# Patient Record
Sex: FEMALE | Race: WHITE | Employment: UNEMPLOYED | URBAN - NONMETROPOLITAN AREA
[De-identification: names, ages, dates, MRNs, and addresses within clinical notes are randomized per-mention and may not be internally consistent; named-entity substitution may affect disease eponyms.]

---

## 2017-12-11 ENCOUNTER — OFFICE VISIT (OUTPATIENT)
Dept: NEUROSURGERY | Age: 27
End: 2017-12-11
Payer: COMMERCIAL

## 2017-12-11 VITALS
DIASTOLIC BLOOD PRESSURE: 68 MMHG | SYSTOLIC BLOOD PRESSURE: 138 MMHG | HEIGHT: 65 IN | OXYGEN SATURATION: 90 % | HEART RATE: 136 BPM | BODY MASS INDEX: 36.65 KG/M2 | WEIGHT: 220 LBS

## 2017-12-11 DIAGNOSIS — R56.9 CONVULSIONS, UNSPECIFIED CONVULSION TYPE (HCC): Primary | ICD-10-CM

## 2017-12-11 PROCEDURE — 99204 OFFICE O/P NEW MOD 45 MIN: CPT | Performed by: PSYCHIATRY & NEUROLOGY

## 2017-12-11 RX ORDER — IBUPROFEN 800 MG/1
800 TABLET ORAL EVERY 8 HOURS PRN
Status: ON HOLD | COMMUNITY
Start: 2017-11-15 | End: 2019-09-20 | Stop reason: HOSPADM

## 2017-12-11 RX ORDER — LEVETIRACETAM 500 MG/1
TABLET ORAL
Status: ON HOLD | COMMUNITY
Start: 2017-11-28 | End: 2018-01-10 | Stop reason: HOSPADM

## 2017-12-11 RX ORDER — HYDROXYZINE PAMOATE 50 MG/1
CAPSULE ORAL
COMMUNITY
Start: 2017-11-28 | End: 2018-01-17

## 2017-12-11 RX ORDER — GABAPENTIN 600 MG/1
600 TABLET ORAL 4 TIMES DAILY
COMMUNITY
Start: 2017-11-28

## 2017-12-11 RX ORDER — BUPRENORPHINE AND NALOXONE 8; 2 MG/1; MG/1
FILM, SOLUBLE BUCCAL; SUBLINGUAL DAILY
COMMUNITY
End: 2017-12-11

## 2017-12-11 RX ORDER — BUPRENORPHINE HYDROCHLORIDE AND NALOXONE HYDROCHLORIDE DIHYDRATE 8; 2 MG/1; MG/1
2.5 TABLET SUBLINGUAL DAILY
Refills: 0 | COMMUNITY
Start: 2017-12-06

## 2017-12-11 RX ORDER — AMITRIPTYLINE HYDROCHLORIDE 150 MG/1
TABLET, FILM COATED ORAL 2 TIMES DAILY
COMMUNITY
Start: 2017-11-01 | End: 2018-01-17 | Stop reason: SDUPTHER

## 2017-12-11 NOTE — PROGRESS NOTES
[] Sleep Apnea  [x] Denies all unless marked  Hematological:[] Adenopathy [] Bruises/Bleeds Easily  [x] Denies all unless marked  Endocrine: [] Cold Intolerance [] Heat Intolerance [] Polydipsia [] Polyphagia [] Polyuria  [x]Denies all unless marked  Allergic/Immunologic:[] Environmental Allergies [] Food Allergies [] Immunocompromised state  [x] Denies all unless marked    PHYSICAL EXAM  /68   Pulse 136   Ht 5' 5\" (1.651 m)   Wt 220 lb (99.8 kg)   LMP 11/27/2017   SpO2 90%   BMI 36.61 kg/m²     Constitutional  No acute distress    HEENT- Conjunctiva normal.  No scars, masses, or lesions over external nose or ears  Musculoskeletal  No significant wasting of muscles noted, no bony deformities  Extremities - No clubbing, cyanosis or edema  Skin  Warm, dry, and intact. No rash, erythema, or pallor  Psychiatric  Mood, affect, and behavior appear normal      NEUROLOGICAL EXAM    Mental status   [x]Awake, alert, oriented   [x]Affect attention and concentration appear appropriate  [x]Recent and remote memory appears unremarkable  [x]Speech normal without dysarthria or aphasia, comprehension and repetition intact.    COMMENTS:    Cranial Nerves [x]No VF deficit to confrontation,  no papilledema on fundoscopic exam.  [x]PERRLA, EOMI, no nystagmus, conjugate eye movements, no ptosis  [x]Face symmetric  [x]Facial sensation intact  [x]Tongue midline no atrophy or fasciculations present  [x]Palate midline, hearing to finger rub normal bilaterally  [x]Shoulder shrug and SCM testing normal bilaterally  COMMENTS:   Motor   [x]5/5 strength x 4 extremities  [x]Normal bulk and tone  [x]No tremor present  [x]No rigidity or bradykinesia noted  COMMENTS:   Sensory  [x]Sensation intact to light touch, pin prick, vibration, and proprioception BLE  [x]Sensation intact to light touch, pin prick, vibration, and proprioception BUE  COMMENTS:   Coordination [x]FTN normal bilaterally   [x]HTS normal bilaterally  [x]LEXI normal

## 2017-12-12 ENCOUNTER — TELEPHONE (OUTPATIENT)
Dept: NEUROSURGERY | Age: 27
End: 2017-12-12

## 2017-12-12 NOTE — TELEPHONE ENCOUNTER
Called patient with test appointment ,LMOM. Could not reach patient with appointment  No one answered the phone.

## 2017-12-28 ENCOUNTER — HOSPITAL ENCOUNTER (OUTPATIENT)
Dept: MRI IMAGING | Age: 27
Discharge: HOME | End: 2017-12-28
Payer: COMMERCIAL

## 2017-12-28 DIAGNOSIS — R56.9 CONVULSIONS, UNSPECIFIED CONVULSION TYPE (HCC): ICD-10-CM

## 2017-12-28 PROCEDURE — A9577 INJ MULTIHANCE: HCPCS | Performed by: PSYCHIATRY & NEUROLOGY

## 2017-12-28 PROCEDURE — 70553 MRI BRAIN STEM W/O & W/DYE: CPT

## 2017-12-28 PROCEDURE — 6360000004 HC RX CONTRAST MEDICATION: Performed by: PSYCHIATRY & NEUROLOGY

## 2017-12-28 RX ADMIN — GADOBENATE DIMEGLUMINE 15 ML: 529 INJECTION, SOLUTION INTRAVENOUS at 09:17

## 2018-01-09 ENCOUNTER — TELEPHONE (OUTPATIENT)
Dept: PRIMARY CARE CLINIC | Age: 28
End: 2018-01-09

## 2018-01-09 ENCOUNTER — APPOINTMENT (OUTPATIENT)
Dept: CT IMAGING | Age: 28
End: 2018-01-09
Payer: COMMERCIAL

## 2018-01-09 ENCOUNTER — HOSPITAL ENCOUNTER (OUTPATIENT)
Age: 28
Setting detail: OBSERVATION
Discharge: HOME | End: 2018-01-10
Attending: EMERGENCY MEDICINE | Admitting: PSYCHIATRY & NEUROLOGY
Payer: COMMERCIAL

## 2018-01-09 ENCOUNTER — OFFICE VISIT (OUTPATIENT)
Dept: NEUROSURGERY | Age: 28
End: 2018-01-09
Payer: COMMERCIAL

## 2018-01-09 VITALS
SYSTOLIC BLOOD PRESSURE: 124 MMHG | WEIGHT: 220.2 LBS | HEART RATE: 117 BPM | DIASTOLIC BLOOD PRESSURE: 78 MMHG | OXYGEN SATURATION: 98 % | BODY MASS INDEX: 34.56 KG/M2 | HEIGHT: 67 IN

## 2018-01-09 DIAGNOSIS — R93.0 ABNORMAL MRI OF HEAD: ICD-10-CM

## 2018-01-09 DIAGNOSIS — R56.9 CONVULSIONS, UNSPECIFIED CONVULSION TYPE (HCC): Primary | ICD-10-CM

## 2018-01-09 DIAGNOSIS — R56.9 SEIZURES (HCC): Primary | ICD-10-CM

## 2018-01-09 PROBLEM — S09.90XA HEAD INJURY: Status: ACTIVE | Noted: 2018-01-09

## 2018-01-09 PROBLEM — F19.10 SUBSTANCE ABUSE (HCC): Status: ACTIVE | Noted: 2018-01-09

## 2018-01-09 PROBLEM — S06.9XAA TBI (TRAUMATIC BRAIN INJURY): Status: ACTIVE | Noted: 2018-01-09

## 2018-01-09 LAB
ALBUMIN SERPL-MCNC: 4.3 G/DL (ref 3.5–5.2)
ALP BLD-CCNC: 118 U/L (ref 35–104)
ALT SERPL-CCNC: 8 U/L (ref 5–33)
AMPHETAMINE SCREEN, URINE: NEGATIVE
ANION GAP SERPL CALCULATED.3IONS-SCNC: 12 MMOL/L (ref 7–19)
AST SERPL-CCNC: 13 U/L (ref 5–32)
BACTERIA: NEGATIVE /HPF
BARBITURATE SCREEN URINE: NEGATIVE
BASOPHILS ABSOLUTE: 0 K/UL (ref 0–0.2)
BASOPHILS RELATIVE PERCENT: 0.5 % (ref 0–1)
BENZODIAZEPINE SCREEN, URINE: NEGATIVE
BILIRUB SERPL-MCNC: <0.2 MG/DL (ref 0.2–1.2)
BILIRUBIN URINE: NEGATIVE
BLOOD, URINE: NEGATIVE
BUN BLDV-MCNC: 5 MG/DL (ref 6–20)
CALCIUM SERPL-MCNC: 8.9 MG/DL (ref 8.6–10)
CANNABINOID SCREEN URINE: NEGATIVE
CHLORIDE BLD-SCNC: 103 MMOL/L (ref 98–111)
CLARITY: CLEAR
CO2: 26 MMOL/L (ref 22–29)
COCAINE METABOLITE SCREEN URINE: NEGATIVE
COLOR: YELLOW
CREAT SERPL-MCNC: 0.7 MG/DL (ref 0.5–0.9)
EOSINOPHILS ABSOLUTE: 0.1 K/UL (ref 0–0.6)
EOSINOPHILS RELATIVE PERCENT: 2.1 % (ref 0–5)
EPITHELIAL CELLS, UA: 2 /HPF (ref 0–5)
GFR NON-AFRICAN AMERICAN: >60
GLUCOSE BLD-MCNC: 101 MG/DL (ref 74–109)
GLUCOSE URINE: NEGATIVE MG/DL
HCG QUALITATIVE: NEGATIVE
HCT VFR BLD CALC: 39.3 % (ref 37–47)
HEMOGLOBIN: 12.6 G/DL (ref 12–16)
HYALINE CASTS: 0 /HPF (ref 0–8)
KETONES, URINE: NEGATIVE MG/DL
LEUKOCYTE ESTERASE, URINE: ABNORMAL
LYMPHOCYTES ABSOLUTE: 1.8 K/UL (ref 1.1–4.5)
LYMPHOCYTES RELATIVE PERCENT: 27.1 % (ref 20–40)
Lab: ABNORMAL
MCH RBC QN AUTO: 27 PG (ref 27–31)
MCHC RBC AUTO-ENTMCNC: 32.1 G/DL (ref 33–37)
MCV RBC AUTO: 84.3 FL (ref 81–99)
MONOCYTES ABSOLUTE: 0.3 K/UL (ref 0–0.9)
MONOCYTES RELATIVE PERCENT: 3.8 % (ref 0–10)
NEUTROPHILS ABSOLUTE: 4.3 K/UL (ref 1.5–7.5)
NEUTROPHILS RELATIVE PERCENT: 66.2 % (ref 50–65)
NITRITE, URINE: NEGATIVE
OPIATE SCREEN URINE: POSITIVE
PDW BLD-RTO: 15.4 % (ref 11.5–14.5)
PH UA: 7
PLATELET # BLD: 165 K/UL (ref 130–400)
PMV BLD AUTO: 11 FL (ref 9.4–12.3)
POTASSIUM SERPL-SCNC: 3.8 MMOL/L (ref 3.5–5)
PROTEIN UA: NEGATIVE MG/DL
RBC # BLD: 4.66 M/UL (ref 4.2–5.4)
RBC UA: 1 /HPF (ref 0–4)
SODIUM BLD-SCNC: 141 MMOL/L (ref 136–145)
SPECIFIC GRAVITY UA: 1.01
TOTAL PROTEIN: 7.3 G/DL (ref 6.6–8.7)
UROBILINOGEN, URINE: 1 E.U./DL
WBC # BLD: 6.5 K/UL (ref 4.8–10.8)
WBC UA: 3 /HPF (ref 0–5)

## 2018-01-09 PROCEDURE — G0378 HOSPITAL OBSERVATION PER HR: HCPCS

## 2018-01-09 PROCEDURE — 99285 EMERGENCY DEPT VISIT HI MDM: CPT | Performed by: EMERGENCY MEDICINE

## 2018-01-09 PROCEDURE — 2580000003 HC RX 258: Performed by: PSYCHIATRY & NEUROLOGY

## 2018-01-09 PROCEDURE — 81001 URINALYSIS AUTO W/SCOPE: CPT

## 2018-01-09 PROCEDURE — 6360000002 HC RX W HCPCS: Performed by: PSYCHIATRY & NEUROLOGY

## 2018-01-09 PROCEDURE — 85025 COMPLETE CBC W/AUTO DIFF WBC: CPT

## 2018-01-09 PROCEDURE — G8427 DOCREV CUR MEDS BY ELIG CLIN: HCPCS | Performed by: PSYCHIATRY & NEUROLOGY

## 2018-01-09 PROCEDURE — 72125 CT NECK SPINE W/O DYE: CPT

## 2018-01-09 PROCEDURE — 36415 COLL VENOUS BLD VENIPUNCTURE: CPT

## 2018-01-09 PROCEDURE — 6360000002 HC RX W HCPCS

## 2018-01-09 PROCEDURE — 6370000000 HC RX 637 (ALT 250 FOR IP): Performed by: PSYCHIATRY & NEUROLOGY

## 2018-01-09 PROCEDURE — 80307 DRUG TEST PRSMV CHEM ANLYZR: CPT

## 2018-01-09 PROCEDURE — G8417 CALC BMI ABV UP PARAM F/U: HCPCS | Performed by: PSYCHIATRY & NEUROLOGY

## 2018-01-09 PROCEDURE — 99214 OFFICE O/P EST MOD 30 MIN: CPT | Performed by: PSYCHIATRY & NEUROLOGY

## 2018-01-09 PROCEDURE — 99285 EMERGENCY DEPT VISIT HI MDM: CPT

## 2018-01-09 PROCEDURE — 84703 CHORIONIC GONADOTROPIN ASSAY: CPT

## 2018-01-09 PROCEDURE — G8484 FLU IMMUNIZE NO ADMIN: HCPCS | Performed by: PSYCHIATRY & NEUROLOGY

## 2018-01-09 PROCEDURE — 80053 COMPREHEN METABOLIC PANEL: CPT

## 2018-01-09 PROCEDURE — 4004F PT TOBACCO SCREEN RCVD TLK: CPT | Performed by: PSYCHIATRY & NEUROLOGY

## 2018-01-09 PROCEDURE — 99223 1ST HOSP IP/OBS HIGH 75: CPT | Performed by: PSYCHIATRY & NEUROLOGY

## 2018-01-09 PROCEDURE — 70450 CT HEAD/BRAIN W/O DYE: CPT

## 2018-01-09 RX ORDER — AMITRIPTYLINE HYDROCHLORIDE 75 MG/1
150 TABLET, FILM COATED ORAL NIGHTLY
Status: DISCONTINUED | OUTPATIENT
Start: 2018-01-09 | End: 2018-01-10 | Stop reason: HOSPADM

## 2018-01-09 RX ORDER — BUPRENORPHINE HYDROCHLORIDE AND NALOXONE HYDROCHLORIDE DIHYDRATE 2; .5 MG/1; MG/1
6 TABLET SUBLINGUAL DAILY
Status: DISCONTINUED | OUTPATIENT
Start: 2018-01-09 | End: 2018-01-10 | Stop reason: HOSPADM

## 2018-01-09 RX ORDER — LORAZEPAM 2 MG/ML
4 INJECTION INTRAMUSCULAR ONCE
Status: COMPLETED | OUTPATIENT
Start: 2018-01-09 | End: 2018-01-09

## 2018-01-09 RX ORDER — LORAZEPAM 2 MG/ML
INJECTION INTRAMUSCULAR
Status: COMPLETED
Start: 2018-01-09 | End: 2018-01-09

## 2018-01-09 RX ORDER — LEVETIRACETAM 500 MG/1
1500 TABLET ORAL 2 TIMES DAILY
Status: DISCONTINUED | OUTPATIENT
Start: 2018-01-09 | End: 2018-01-10 | Stop reason: HOSPADM

## 2018-01-09 RX ORDER — ONDANSETRON 4 MG/1
4 TABLET, ORALLY DISINTEGRATING ORAL EVERY 8 HOURS PRN
Status: DISCONTINUED | OUTPATIENT
Start: 2018-01-09 | End: 2018-01-10 | Stop reason: HOSPADM

## 2018-01-09 RX ORDER — LORAZEPAM 2 MG/ML
2 INJECTION INTRAMUSCULAR
Status: DISCONTINUED | OUTPATIENT
Start: 2018-01-09 | End: 2018-01-10 | Stop reason: HOSPADM

## 2018-01-09 RX ORDER — BUSPIRONE HYDROCHLORIDE 10 MG/1
10 TABLET ORAL DAILY
Status: ON HOLD | COMMUNITY
Start: 2017-12-20 | End: 2018-01-09

## 2018-01-09 RX ORDER — SODIUM CHLORIDE 9 MG/ML
INJECTION, SOLUTION INTRAVENOUS CONTINUOUS
Status: DISCONTINUED | OUTPATIENT
Start: 2018-01-09 | End: 2018-01-10 | Stop reason: HOSPADM

## 2018-01-09 RX ORDER — GABAPENTIN 600 MG/1
600 TABLET ORAL DAILY
Status: DISCONTINUED | OUTPATIENT
Start: 2018-01-09 | End: 2018-01-10 | Stop reason: HOSPADM

## 2018-01-09 RX ORDER — SODIUM CHLORIDE 0.9 % (FLUSH) 0.9 %
10 SYRINGE (ML) INJECTION PRN
Status: DISCONTINUED | OUTPATIENT
Start: 2018-01-09 | End: 2018-01-10 | Stop reason: HOSPADM

## 2018-01-09 RX ORDER — SODIUM CHLORIDE 0.9 % (FLUSH) 0.9 %
10 SYRINGE (ML) INJECTION EVERY 12 HOURS SCHEDULED
Status: DISCONTINUED | OUTPATIENT
Start: 2018-01-09 | End: 2018-01-10 | Stop reason: HOSPADM

## 2018-01-09 RX ORDER — ONDANSETRON 2 MG/ML
4 INJECTION INTRAMUSCULAR; INTRAVENOUS EVERY 6 HOURS PRN
Status: DISCONTINUED | OUTPATIENT
Start: 2018-01-09 | End: 2018-01-10 | Stop reason: HOSPADM

## 2018-01-09 RX ORDER — BUSPIRONE HYDROCHLORIDE 10 MG/1
10 TABLET ORAL DAILY
Status: DISCONTINUED | OUTPATIENT
Start: 2018-01-09 | End: 2018-01-10 | Stop reason: HOSPADM

## 2018-01-09 RX ORDER — ACETAMINOPHEN 325 MG/1
650 TABLET ORAL EVERY 4 HOURS PRN
Status: DISCONTINUED | OUTPATIENT
Start: 2018-01-09 | End: 2018-01-10 | Stop reason: HOSPADM

## 2018-01-09 RX ORDER — ACETAMINOPHEN 325 MG/1
650 TABLET ORAL EVERY 4 HOURS PRN
Status: DISCONTINUED | OUTPATIENT
Start: 2018-01-09 | End: 2018-01-09

## 2018-01-09 RX ADMIN — LORAZEPAM 4 MG: 2 INJECTION INTRAMUSCULAR; INTRAVENOUS at 16:19

## 2018-01-09 RX ADMIN — GABAPENTIN 600 MG: 600 TABLET, FILM COATED ORAL at 20:50

## 2018-01-09 RX ADMIN — LEVETIRACETAM 1500 MG: 500 TABLET ORAL at 20:51

## 2018-01-09 RX ADMIN — AMITRIPTYLINE HYDROCHLORIDE 150 MG: 75 TABLET, FILM COATED ORAL at 20:50

## 2018-01-09 RX ADMIN — LORAZEPAM 4 MG: 2 INJECTION INTRAMUSCULAR at 16:19

## 2018-01-09 RX ADMIN — BUPRENORPHINE AND NALOXONE 6 TABLET: 2; .5 TABLET SUBLINGUAL at 21:00

## 2018-01-09 RX ADMIN — Medication 10 ML: at 20:11

## 2018-01-09 RX ADMIN — ACETAMINOPHEN 650 MG: 325 TABLET ORAL at 20:11

## 2018-01-09 RX ADMIN — SODIUM CHLORIDE: 9 INJECTION, SOLUTION INTRAVENOUS at 20:11

## 2018-01-09 ASSESSMENT — PAIN SCALES - GENERAL
PAINLEVEL_OUTOF10: 3
PAINLEVEL_OUTOF10: 9
PAINLEVEL_OUTOF10: 5

## 2018-01-09 ASSESSMENT — ENCOUNTER SYMPTOMS
COUGH: 0
SHORTNESS OF BREATH: 0

## 2018-01-09 NOTE — ED TRIAGE NOTES
Family states they just left Dr. Chris Russell office for regular appointment and got in vehicle and started having a seizure. Family member went back to Doctors office and was told to bring her to ED. Patient is alert at this time.

## 2018-01-09 NOTE — ED PROVIDER NOTES
Cardiovascular: Regular rhythm and normal heart sounds. Mild tachy   Pulmonary/Chest: Effort normal and breath sounds normal.   Abdominal: Soft. Musculoskeletal: She exhibits no edema. Neurological: No cranial nerve deficit. Skin: Skin is warm and dry. She is not diaphoretic. Psychiatric:   Slow responses, keeps eyes closed, but responds. Sits up when asked. Nursing note and vitals reviewed. DIAGNOSTIC RESULTS     EKG: All EKG's are interpreted by the Emergency Department Physician who either signs or Co-signs this chart in the absence of a cardiologist.        RADIOLOGY:   Non-plain film images such as CT, Ultrasound and MRI are read by the radiologist. Plain radiographic images are visualized and preliminarily interpreted by the emergency physician with the below findings:        Interpretation per the Radiologist below, if available at the time of this note:    CT Cervical Spine WO Contrast   Final Result   No evidence of fracture or displacement/malalignment. The above findings are recorded on a digital voice clip in PACS. Signed by Dr Jose Miguel Bowen on 1/9/2018 4:50 PM      CT Head WO Contrast   Final Result   1. No acute intracranial process. 2. Left parietal subgaleal hematoma with overlying skin laceration. No   calvarial fracture. 3. Ostiomeatal unit pattern of chronic sinus disease, with   opacification of the left maxillary and left frontal sinuses in   addition to the anterior ethmoids.    Signed by Dr Ade Mac on 1/9/2018 4:46 PM            ED BEDSIDE ULTRASOUND:   Performed by ED Physician - none    LABS:  Labs Reviewed   CBC WITH AUTO DIFFERENTIAL - Abnormal; Notable for the following:        Result Value    MCHC 32.1 (*)     RDW 15.4 (*)     Neutrophils % 66.2 (*)     All other components within normal limits   COMPREHENSIVE METABOLIC PANEL - Abnormal; Notable for the following:     BUN 5 (*)     Alkaline Phosphatase 118 (*)     All other components within normal

## 2018-01-09 NOTE — H&P
Lumbar spine instability     Seizures (HCC)     Substance abuse     opioid addition        Past Surgical History:      Procedure Laterality Date    APPENDECTOMY      LEEP      MOUTH SURGERY      REMOVAL OF ALL TEETH BUT 3       Medications in Hospital:    No current facility-administered medications for this encounter. Current Outpatient Prescriptions:     busPIRone (BUSPAR) 10 MG tablet, Take 10 mg by mouth daily, Disp: , Rfl:     gabapentin (NEURONTIN) 600 MG tablet, , Disp: , Rfl:     levETIRAcetam (KEPPRA) 500 MG tablet, , Disp: , Rfl:     hydrOXYzine (VISTARIL) 50 MG capsule, , Disp: , Rfl:     buprenorphine-naloxone (SUBOXONE) 8-2 MG SUBL SL tablet, TAKE 1.5 (ONE AND A HALF) TABLET BY MOUTH SUBLINGUALLY EVERY DAY, Disp: , Rfl: 0    amitriptyline (ELAVIL) 150 MG tablet, , Disp: , Rfl:     ibuprofen (ADVIL;MOTRIN) 800 MG tablet, , Disp: , Rfl:     Allergies:  Review of patient's allergies indicates no known allergies. Social History:   TOBACCO:   reports that she has been smoking. She has been smoking about 0.50 packs per day. She has never used smokeless tobacco.  ETOH:   reports that she does not drink alcohol. Family History:       Problem Relation Age of Onset    High Blood Pressure Father            Physical Exam:    Vitals: BP (!) 138/90   Pulse 91   Temp 99 °F (37.2 °C) (Tympanic)   Resp 16   Ht 5' 7\" (1.702 m)   Wt 220 lb (99.8 kg)   LMP 12/08/2017   SpO2 98%   BMI 34.46 kg/m²     Constitutional  well developed, well nourished.     Eyes  conjunctiva normal.  Pupils react to light  Ear, nose, throat -hearing intact to finger rub No scars, masses, or lesions over external nose or ears, no atrophy of tongue  Neck-symmetric, no masses noted, no jugular vein distension  Respiration- chest wall appears symmetric, good expansion,   normal effort without use of accessory muscles  Musculoskeletal  no significant wasting of muscles noted, no bony deformities  Extremities-no

## 2018-01-09 NOTE — ED NOTES
Pt family came into lobby stating patient was having a seizure in the truck. Upon viewing patient, she was noted lighting a cigarette. Patient was alert and verbal;y appropriate and followed commands appropriately. Patient transferred to wheel chair per self and was taken to the registration desk. Patient was in no distress.      Brice Moore RN  [phone]

## 2018-01-10 ENCOUNTER — TELEPHONE (OUTPATIENT)
Dept: NEUROSURGERY | Age: 28
End: 2018-01-10

## 2018-01-10 ENCOUNTER — TELEPHONE (OUTPATIENT)
Dept: PRIMARY CARE CLINIC | Age: 28
End: 2018-01-10

## 2018-01-10 VITALS
HEART RATE: 92 BPM | BODY MASS INDEX: 34.53 KG/M2 | OXYGEN SATURATION: 94 % | DIASTOLIC BLOOD PRESSURE: 76 MMHG | SYSTOLIC BLOOD PRESSURE: 105 MMHG | HEIGHT: 67 IN | TEMPERATURE: 97.6 F | RESPIRATION RATE: 18 BRPM | WEIGHT: 220 LBS

## 2018-01-10 PROCEDURE — 6370000000 HC RX 637 (ALT 250 FOR IP): Performed by: PSYCHIATRY & NEUROLOGY

## 2018-01-10 PROCEDURE — 6360000002 HC RX W HCPCS: Performed by: PSYCHIATRY & NEUROLOGY

## 2018-01-10 PROCEDURE — G0378 HOSPITAL OBSERVATION PER HR: HCPCS

## 2018-01-10 PROCEDURE — 99239 HOSP IP/OBS DSCHRG MGMT >30: CPT | Performed by: PSYCHIATRY & NEUROLOGY

## 2018-01-10 PROCEDURE — 96374 THER/PROPH/DIAG INJ IV PUSH: CPT

## 2018-01-10 RX ORDER — PHENYTOIN SODIUM 100 MG/1
100 CAPSULE, EXTENDED RELEASE ORAL 3 TIMES DAILY
Qty: 90 CAPSULE | Refills: 1 | Status: SHIPPED | OUTPATIENT
Start: 2018-01-10 | End: 2018-03-06 | Stop reason: SDUPTHER

## 2018-01-10 RX ORDER — LEVETIRACETAM 750 MG/1
1500 TABLET ORAL 2 TIMES DAILY
Qty: 120 TABLET | Refills: 1 | Status: SHIPPED | OUTPATIENT
Start: 2018-01-10 | End: 2018-03-06 | Stop reason: SDUPTHER

## 2018-01-10 RX ORDER — PHENYTOIN SODIUM 100 MG/1
100 CAPSULE, EXTENDED RELEASE ORAL 3 TIMES DAILY
Status: DISCONTINUED | OUTPATIENT
Start: 2018-01-10 | End: 2018-01-10 | Stop reason: HOSPADM

## 2018-01-10 RX ADMIN — BUPRENORPHINE AND NALOXONE 6 TABLET: 2; .5 TABLET SUBLINGUAL at 08:34

## 2018-01-10 RX ADMIN — PHENYTOIN SODIUM 100 MG: 100 CAPSULE ORAL at 13:56

## 2018-01-10 RX ADMIN — LEVETIRACETAM 1500 MG: 500 TABLET ORAL at 08:41

## 2018-01-10 RX ADMIN — BUSPIRONE HYDROCHLORIDE 10 MG: 10 TABLET ORAL at 08:34

## 2018-01-10 RX ADMIN — GABAPENTIN 600 MG: 600 TABLET, FILM COATED ORAL at 08:34

## 2018-01-10 RX ADMIN — PHENYTOIN SODIUM 100 MG: 100 CAPSULE ORAL at 08:41

## 2018-01-10 RX ADMIN — ACETAMINOPHEN 650 MG: 325 TABLET ORAL at 14:09

## 2018-01-10 ASSESSMENT — PAIN SCALES - GENERAL
PAINLEVEL_OUTOF10: 7
PAINLEVEL_OUTOF10: 8

## 2018-01-10 NOTE — DISCHARGE INSTR - DIET

## 2018-01-10 NOTE — TELEPHONE ENCOUNTER
hospital call needed to 2 week f/u and a test schedule for candace ed couch,please call the patient with appt

## 2018-01-11 ENCOUNTER — TELEPHONE (OUTPATIENT)
Dept: NEUROSURGERY | Age: 28
End: 2018-01-11

## 2018-01-16 ENCOUNTER — TELEPHONE (OUTPATIENT)
Dept: NEUROSURGERY | Age: 28
End: 2018-01-16

## 2018-01-16 NOTE — TELEPHONE ENCOUNTER
Called and left message about appt being canceled due to bad weather  And to please call back later to r/s  jf

## 2018-01-17 ENCOUNTER — TELEPHONE (OUTPATIENT)
Dept: NEUROSURGERY | Age: 28
End: 2018-01-17

## 2018-01-17 ENCOUNTER — OFFICE VISIT (OUTPATIENT)
Dept: PRIMARY CARE CLINIC | Age: 28
End: 2018-01-17
Payer: COMMERCIAL

## 2018-01-17 VITALS
WEIGHT: 222.2 LBS | HEIGHT: 67 IN | BODY MASS INDEX: 34.88 KG/M2 | SYSTOLIC BLOOD PRESSURE: 132 MMHG | HEART RATE: 122 BPM | TEMPERATURE: 98.2 F | DIASTOLIC BLOOD PRESSURE: 88 MMHG | OXYGEN SATURATION: 98 %

## 2018-01-17 DIAGNOSIS — Z09 HOSPITAL DISCHARGE FOLLOW-UP: ICD-10-CM

## 2018-01-17 DIAGNOSIS — Z71.6 TOBACCO ABUSE COUNSELING: ICD-10-CM

## 2018-01-17 DIAGNOSIS — Z76.89 ENCOUNTER TO ESTABLISH CARE: Primary | ICD-10-CM

## 2018-01-17 DIAGNOSIS — R56.9 SEIZURES (HCC): ICD-10-CM

## 2018-01-17 DIAGNOSIS — F19.10 SUBSTANCE ABUSE (HCC): ICD-10-CM

## 2018-01-17 DIAGNOSIS — Z72.0 TOBACCO ABUSE: ICD-10-CM

## 2018-01-17 PROCEDURE — G8484 FLU IMMUNIZE NO ADMIN: HCPCS | Performed by: NURSE PRACTITIONER

## 2018-01-17 PROCEDURE — 4004F PT TOBACCO SCREEN RCVD TLK: CPT | Performed by: NURSE PRACTITIONER

## 2018-01-17 PROCEDURE — G8428 CUR MEDS NOT DOCUMENT: HCPCS | Performed by: NURSE PRACTITIONER

## 2018-01-17 PROCEDURE — 99408 AUDIT/DAST 15-30 MIN: CPT | Performed by: NURSE PRACTITIONER

## 2018-01-17 PROCEDURE — 99203 OFFICE O/P NEW LOW 30 MIN: CPT | Performed by: NURSE PRACTITIONER

## 2018-01-17 PROCEDURE — G8417 CALC BMI ABV UP PARAM F/U: HCPCS | Performed by: NURSE PRACTITIONER

## 2018-01-17 PROCEDURE — 99406 BEHAV CHNG SMOKING 3-10 MIN: CPT | Performed by: NURSE PRACTITIONER

## 2018-01-17 PROCEDURE — 1111F DSCHRG MED/CURRENT MED MERGE: CPT | Performed by: NURSE PRACTITIONER

## 2018-01-17 RX ORDER — AMITRIPTYLINE HYDROCHLORIDE 150 MG/1
150 TABLET, FILM COATED ORAL 2 TIMES DAILY
Qty: 60 TABLET | Refills: 1 | Status: SHIPPED | OUTPATIENT
Start: 2018-01-17 | End: 2018-07-24

## 2018-01-17 ASSESSMENT — ENCOUNTER SYMPTOMS
EYES NEGATIVE: 1
GASTROINTESTINAL NEGATIVE: 1
RESPIRATORY NEGATIVE: 1

## 2018-01-17 NOTE — PROGRESS NOTES
times daily 60 tablet 1    levETIRAcetam (KEPPRA) 750 MG tablet Take 2 tablets by mouth 2 times daily 120 tablet 1    phenytoin (DILANTIN) 100 MG ER capsule Take 1 capsule by mouth 3 times daily 90 capsule 1    gabapentin (NEURONTIN) 600 MG tablet 600 mg .  buprenorphine-naloxone (SUBOXONE) 8-2 MG SUBL SL tablet TAKE 1.5 (ONE AND A HALF) TABLET BY MOUTH SUBLINGUALLY EVERY DAY  0    ibuprofen (ADVIL;MOTRIN) 800 MG tablet 800 mg every 6 hours as needed        No current facility-administered medications for this visit. No Known Allergies    Family History   Problem Relation Age of Onset    High Blood Pressure Father          Subjective:      Review of Systems   Constitutional: Negative. HENT: Negative. Eyes: Negative. Respiratory: Negative. Cardiovascular: Negative. Gastrointestinal: Negative. Endocrine: Negative. Genitourinary: Negative. Musculoskeletal: Negative. Skin: Negative. Neurological: Positive for seizures. Negative for dizziness and weakness. Hematological: Negative. Psychiatric/Behavioral: Negative. Objective:     Physical Exam   Constitutional: She is oriented to person, place, and time. Vital signs are normal. She appears well-developed and well-nourished. HENT:   Head: Normocephalic and atraumatic. Right Ear: Hearing, tympanic membrane, external ear and ear canal normal.   Left Ear: Hearing, tympanic membrane, external ear and ear canal normal.   Nose: Nose normal.   Mouth/Throat: Uvula is midline, oropharynx is clear and moist and mucous membranes are normal.   Eyes: Conjunctivae, EOM and lids are normal. Pupils are equal, round, and reactive to light. Neck: Trachea normal and normal range of motion. Neck supple. No thyroid mass and no thyromegaly present. Cardiovascular: Normal rate, regular rhythm, normal heart sounds and normal pulses. Pulmonary/Chest: Effort normal and breath sounds normal.   Abdominal: Soft.  Normal appearance

## 2018-01-22 ENCOUNTER — HOSPITAL ENCOUNTER (OUTPATIENT)
Dept: MRI IMAGING | Age: 28
Discharge: HOME | End: 2018-01-22
Payer: COMMERCIAL

## 2018-01-22 DIAGNOSIS — R93.0 ABNORMAL MRI OF HEAD: ICD-10-CM

## 2018-01-22 DIAGNOSIS — R56.9 CONVULSIONS, UNSPECIFIED CONVULSION TYPE (HCC): ICD-10-CM

## 2018-01-22 PROCEDURE — 6360000004 HC RX CONTRAST MEDICATION: Performed by: PSYCHIATRY & NEUROLOGY

## 2018-01-22 PROCEDURE — A9577 INJ MULTIHANCE: HCPCS | Performed by: PSYCHIATRY & NEUROLOGY

## 2018-01-22 PROCEDURE — 70553 MRI BRAIN STEM W/O & W/DYE: CPT

## 2018-01-22 RX ADMIN — GADOBENATE DIMEGLUMINE 20 ML: 529 INJECTION, SOLUTION INTRAVENOUS at 09:42

## 2018-01-24 ENCOUNTER — OFFICE VISIT (OUTPATIENT)
Dept: NEUROSURGERY | Age: 28
End: 2018-01-24
Payer: COMMERCIAL

## 2018-01-24 DIAGNOSIS — R56.9 CONVULSIONS, UNSPECIFIED CONVULSION TYPE (HCC): Primary | ICD-10-CM

## 2018-01-24 DIAGNOSIS — R56.9 CONVULSIONS, UNSPECIFIED CONVULSION TYPE (HCC): ICD-10-CM

## 2018-01-24 DIAGNOSIS — R93.0 ABNORMAL MRI OF HEAD: ICD-10-CM

## 2018-01-24 LAB
ALBUMIN SERPL-MCNC: 4.2 G/DL (ref 3.5–5.2)
ALP BLD-CCNC: 157 U/L (ref 35–104)
ALT SERPL-CCNC: 8 U/L (ref 5–33)
ANION GAP SERPL CALCULATED.3IONS-SCNC: 15 MMOL/L (ref 7–19)
AST SERPL-CCNC: 13 U/L (ref 5–32)
BASOPHILS ABSOLUTE: 0.1 K/UL (ref 0–0.2)
BASOPHILS RELATIVE PERCENT: 0.9 % (ref 0–1)
BILIRUB SERPL-MCNC: <0.2 MG/DL (ref 0.2–1.2)
BUN BLDV-MCNC: 9 MG/DL (ref 6–20)
CALCIUM SERPL-MCNC: 9.3 MG/DL (ref 8.6–10)
CHLORIDE BLD-SCNC: 101 MMOL/L (ref 98–111)
CO2: 27 MMOL/L (ref 22–29)
CREAT SERPL-MCNC: 0.6 MG/DL (ref 0.5–0.9)
EOSINOPHILS ABSOLUTE: 0.6 K/UL (ref 0–0.6)
EOSINOPHILS RELATIVE PERCENT: 9.5 % (ref 0–5)
GFR NON-AFRICAN AMERICAN: >60
GLUCOSE BLD-MCNC: 88 MG/DL (ref 74–109)
HCT VFR BLD CALC: 42 % (ref 37–47)
HEMOGLOBIN: 13.2 G/DL (ref 12–16)
LYMPHOCYTES ABSOLUTE: 2.7 K/UL (ref 1.1–4.5)
LYMPHOCYTES RELATIVE PERCENT: 46.8 % (ref 20–40)
MCH RBC QN AUTO: 26.6 PG (ref 27–31)
MCHC RBC AUTO-ENTMCNC: 31.4 G/DL (ref 33–37)
MCV RBC AUTO: 84.7 FL (ref 81–99)
MONOCYTES ABSOLUTE: 0.3 K/UL (ref 0–0.9)
MONOCYTES RELATIVE PERCENT: 5.7 % (ref 0–10)
NEUTROPHILS ABSOLUTE: 2.2 K/UL (ref 1.5–7.5)
NEUTROPHILS RELATIVE PERCENT: 36.9 % (ref 50–65)
PDW BLD-RTO: 15.3 % (ref 11.5–14.5)
PHENYTOIN LEVEL: 2 UG/ML (ref 10–20)
PLATELET # BLD: 227 K/UL (ref 130–400)
PMV BLD AUTO: 10.9 FL (ref 9.4–12.3)
POTASSIUM SERPL-SCNC: 4.1 MMOL/L (ref 3.5–5)
RBC # BLD: 4.96 M/UL (ref 4.2–5.4)
SODIUM BLD-SCNC: 143 MMOL/L (ref 136–145)
TOTAL PROTEIN: 7.5 G/DL (ref 6.6–8.7)
WBC # BLD: 5.8 K/UL (ref 4.8–10.8)

## 2018-01-24 PROCEDURE — 4004F PT TOBACCO SCREEN RCVD TLK: CPT | Performed by: PSYCHIATRY & NEUROLOGY

## 2018-01-24 PROCEDURE — 99214 OFFICE O/P EST MOD 30 MIN: CPT | Performed by: PSYCHIATRY & NEUROLOGY

## 2018-01-24 PROCEDURE — G8484 FLU IMMUNIZE NO ADMIN: HCPCS | Performed by: PSYCHIATRY & NEUROLOGY

## 2018-01-24 PROCEDURE — G8417 CALC BMI ABV UP PARAM F/U: HCPCS | Performed by: PSYCHIATRY & NEUROLOGY

## 2018-01-24 PROCEDURE — G8427 DOCREV CUR MEDS BY ELIG CLIN: HCPCS | Performed by: PSYCHIATRY & NEUROLOGY

## 2018-01-24 NOTE — PROGRESS NOTES
Ataxia  [x] Denies all unless marked  Psychiatric/Behavioral:[] Depression [] Anxiety [] Confusion [] Agitation [] Behavior Problems  [] Hallucinations  [] Suicidal idiation  [x] Denies all unless marked  Sleep: []  Insomnia [] Sleep Disturbance [] Snoring [] Restless Legs [] Daytime Sleeping [] Sleep Apnea  [x] Denies all unless marked  Hematological:[] Adenopathy [] Bruises/Bleeds Easily  [x] Denies all unless marked  Endocrine: [] Cold Intolerance [] Heat Intolerance [] Polydipsia [] Polyphagia [] Polyuria  [x]Denies all unless marked  Allergic/Immunologic:[] Environmental Allergies [] Food Allergies [] Immunocompromised state  [x] Denies all unless marked    PHYSICAL EXAM  LMP 12/08/2017     Constitutional  No acute distress    HEENT- Conjunctiva normal.  No scars, masses, or lesions over external nose or ears  Musculoskeletal  No significant wasting of muscles noted, no bony deformities  Extremities - No clubbing, cyanosis or edema  Skin  Warm, dry, and intact. No rash, erythema, or pallor  Psychiatric  Mood, affect, and behavior appear normal      NEUROLOGICAL EXAM    Mental status   [x]Awake, alert, oriented   [x]Affect attention and concentration appear appropriate  [x]Recent and remote memory appears unremarkable  [x]Speech normal without dysarthria or aphasia, comprehension and repetition intact.    COMMENTS:    Cranial Nerves [x]No VF deficit to confrontation,  no papilledema on fundoscopic exam.  [x]PERRLA, EOMI, no nystagmus, conjugate eye movements, no ptosis  [x]Face symmetric  [x]Facial sensation intact  [x]Tongue midline no atrophy or fasciculations present  [x]Palate midline, hearing to finger rub normal bilaterally  [x]Shoulder shrug and SCM testing normal bilaterally  COMMENTS:   Motor   [x]5/5 strength x 4 extremities  [x]Normal bulk and tone  [x]No tremor present  [x]No rigidity or bradykinesia noted  COMMENTS:   Sensory  [x]Sensation intact to light touch, pin prick, vibration, and

## 2018-01-25 ENCOUNTER — TELEPHONE (OUTPATIENT)
Dept: NEUROLOGY | Age: 28
End: 2018-01-25

## 2018-02-15 ENCOUNTER — TELEPHONE (OUTPATIENT)
Dept: NEUROSURGERY | Age: 28
End: 2018-02-15

## 2018-03-19 ENCOUNTER — HOSPITAL ENCOUNTER (OUTPATIENT)
Dept: NEUROLOGY | Age: 28
Setting detail: OBSERVATION
Discharge: HOME | End: 2018-03-21
Attending: PSYCHIATRY & NEUROLOGY | Admitting: PSYCHIATRY & NEUROLOGY
Payer: COMMERCIAL

## 2018-03-19 PROBLEM — R56.9 CONVULSIONS (HCC): Status: ACTIVE | Noted: 2018-03-19

## 2018-03-19 PROCEDURE — 95951 HC EEG MONITORING VIDEO RECORDING: CPT

## 2018-03-19 PROCEDURE — 99219 PR INITIAL OBSERVATION CARE/DAY 50 MINUTES: CPT | Performed by: PSYCHIATRY & NEUROLOGY

## 2018-03-19 PROCEDURE — G0378 HOSPITAL OBSERVATION PER HR: HCPCS

## 2018-03-19 PROCEDURE — 6360000002 HC RX W HCPCS: Performed by: PSYCHIATRY & NEUROLOGY

## 2018-03-19 PROCEDURE — 95951 PR EEG MONITORING/VIDEORECORD: CPT | Performed by: PSYCHIATRY & NEUROLOGY

## 2018-03-19 PROCEDURE — 6370000000 HC RX 637 (ALT 250 FOR IP): Performed by: PSYCHIATRY & NEUROLOGY

## 2018-03-19 RX ORDER — AMITRIPTYLINE HYDROCHLORIDE 75 MG/1
150 TABLET, FILM COATED ORAL NIGHTLY
Status: DISCONTINUED | OUTPATIENT
Start: 2018-03-19 | End: 2018-03-21 | Stop reason: HOSPADM

## 2018-03-19 RX ORDER — SODIUM CHLORIDE 0.9 % (FLUSH) 0.9 %
10 SYRINGE (ML) INJECTION EVERY 12 HOURS SCHEDULED
Status: DISCONTINUED | OUTPATIENT
Start: 2018-03-19 | End: 2018-03-21 | Stop reason: HOSPADM

## 2018-03-19 RX ORDER — PHENYTOIN SODIUM 100 MG/1
100 CAPSULE, EXTENDED RELEASE ORAL 3 TIMES DAILY
Status: DISCONTINUED | OUTPATIENT
Start: 2018-03-19 | End: 2018-03-20

## 2018-03-19 RX ORDER — BUPRENORPHINE HYDROCHLORIDE AND NALOXONE HYDROCHLORIDE DIHYDRATE 2; .5 MG/1; MG/1
6 TABLET SUBLINGUAL DAILY
Status: DISCONTINUED | OUTPATIENT
Start: 2018-03-19 | End: 2018-03-21 | Stop reason: HOSPADM

## 2018-03-19 RX ORDER — IBUPROFEN 400 MG/1
800 TABLET ORAL EVERY 8 HOURS PRN
Status: DISCONTINUED | OUTPATIENT
Start: 2018-03-19 | End: 2018-03-21 | Stop reason: HOSPADM

## 2018-03-19 RX ORDER — BUPRENORPHINE HYDROCHLORIDE AND NALOXONE HYDROCHLORIDE DIHYDRATE 8; 2 MG/1; MG/1
1.5 TABLET SUBLINGUAL DAILY
Status: DISCONTINUED | OUTPATIENT
Start: 2018-03-19 | End: 2018-03-19

## 2018-03-19 RX ORDER — NICOTINE 21 MG/24HR
1 PATCH, TRANSDERMAL 24 HOURS TRANSDERMAL DAILY
Status: DISCONTINUED | OUTPATIENT
Start: 2018-03-19 | End: 2018-03-21 | Stop reason: HOSPADM

## 2018-03-19 RX ORDER — SODIUM CHLORIDE 0.9 % (FLUSH) 0.9 %
10 SYRINGE (ML) INJECTION PRN
Status: DISCONTINUED | OUTPATIENT
Start: 2018-03-19 | End: 2018-03-21 | Stop reason: HOSPADM

## 2018-03-19 RX ORDER — GABAPENTIN 600 MG/1
600 TABLET ORAL 4 TIMES DAILY
Status: DISCONTINUED | OUTPATIENT
Start: 2018-03-19 | End: 2018-03-21 | Stop reason: HOSPADM

## 2018-03-19 RX ORDER — ONDANSETRON 2 MG/ML
4 INJECTION INTRAMUSCULAR; INTRAVENOUS EVERY 6 HOURS PRN
Status: DISCONTINUED | OUTPATIENT
Start: 2018-03-19 | End: 2018-03-21 | Stop reason: HOSPADM

## 2018-03-19 RX ORDER — LORAZEPAM 2 MG/ML
1 INJECTION INTRAMUSCULAR PRN
Status: DISCONTINUED | OUTPATIENT
Start: 2018-03-19 | End: 2018-03-21 | Stop reason: HOSPADM

## 2018-03-19 RX ORDER — BUPRENORPHINE HYDROCHLORIDE AND NALOXONE HYDROCHLORIDE DIHYDRATE 2; .5 MG/1; MG/1
6 TABLET SUBLINGUAL DAILY
Status: DISCONTINUED | OUTPATIENT
Start: 2018-03-19 | End: 2018-03-19

## 2018-03-19 RX ADMIN — PHENYTOIN SODIUM 100 MG: 100 CAPSULE ORAL at 20:24

## 2018-03-19 RX ADMIN — GABAPENTIN 600 MG: 600 TABLET, FILM COATED ORAL at 11:54

## 2018-03-19 RX ADMIN — BUPRENORPHINE AND NALOXONE 6 TABLET: 2; .5 TABLET SUBLINGUAL at 14:50

## 2018-03-19 RX ADMIN — GABAPENTIN 600 MG: 600 TABLET, FILM COATED ORAL at 17:01

## 2018-03-19 RX ADMIN — GABAPENTIN 600 MG: 600 TABLET, FILM COATED ORAL at 20:24

## 2018-03-19 RX ADMIN — PHENYTOIN SODIUM 100 MG: 100 CAPSULE ORAL at 10:53

## 2018-03-19 RX ADMIN — AMITRIPTYLINE HYDROCHLORIDE 150 MG: 75 TABLET, FILM COATED ORAL at 20:24

## 2018-03-19 ASSESSMENT — PAIN SCALES - GENERAL
PAINLEVEL_OUTOF10: 0
PAINLEVEL_OUTOF10: 7
PAINLEVEL_OUTOF10: 0
PAINLEVEL_OUTOF10: 0

## 2018-03-19 NOTE — PROGRESS NOTES
Patient is here for video EEG monitoring. She was set up using the 10/20 system. Patient's mother is staying with her while she is here.

## 2018-03-19 NOTE — H&P
HISTORY AND PHYSICAL      Patient:   Marlin Palomares  MR#:    [id number]  Account Number:                   [id number]      Room:    0334/334-01   YOB: 1990  Date of Progress Note: 3/19/2018  Time of Note                           10:46 AM  Attending Physician:  Racquel Graves D.O.       CHIEF COMPLAINT:  Convulsions     HISTORY OF PRESENT ILLNESS:   This 32 y.o. female who presents with recurrent seizure like episodes. Started 4 years ago, prior history of TBI, possible ICH noted, no surgery required. Seizures started a year or so after. Events described as MARAH, GTC activity noted, no bladder incontinence, no tongue biting. Occurring sporadically. MRI with nonspecific white matter abnormalities, no enhancement noted, one is pericallosal. Seen in the ED previously, admitted for breakthrough seizures. Keppra increased to 1500 mg bid and dilantin added 100 mg tid. No history of meningitis or encephalitis. Brother with epilepsy.    Admitted for EEG monitoring and event clarification.        REVIEW OF SYSTEMS  Constitutional:  []Activity change []Appetite change []Chills [] Diaphoresis []Fatigue []Fever []Unexpected Weight Change  [x] Denies all unless marked  HEENT:  []Congestion []Dental Problems []Drooling []Ear Discharge []Ear Pain  []Facial Swelling []Mouth Sores []Nosebleeds []Post Nasal Drip []Rhinorrhea []Sneezing []Sore Throat []Tinnitus []Trouble Swallowing []Voice Change [x] Denies all unless marked []Photophobia []Pain []Discharge []Redness []Itching []Visual Disturbance  [x] Denies all unless marked  Respiratory:  []Apnea []Cough []Choking []Chest Tightness []Shortness of Breath []Wheezing []Stridor  [x] Denies all unless marked  Cardiovascular:  []Chest Pain []Palpitations []Leg Swelling  [x] Denies all unless marked  Gastrointestinal:  []Abdominal Distention []Abdominal Pain []Anal Bleeding []Blood in Stool  []Diarrhea []Constipation []Nausea []Rectal Pain []Vomiting  [x] Denies

## 2018-03-20 PROCEDURE — 6360000002 HC RX W HCPCS: Performed by: PSYCHIATRY & NEUROLOGY

## 2018-03-20 PROCEDURE — 99225 PR SBSQ OBSERVATION CARE/DAY 25 MINUTES: CPT | Performed by: PSYCHIATRY & NEUROLOGY

## 2018-03-20 PROCEDURE — 95951 PR EEG MONITORING/VIDEORECORD: CPT | Performed by: PSYCHIATRY & NEUROLOGY

## 2018-03-20 PROCEDURE — 6370000000 HC RX 637 (ALT 250 FOR IP): Performed by: PSYCHIATRY & NEUROLOGY

## 2018-03-20 PROCEDURE — G0378 HOSPITAL OBSERVATION PER HR: HCPCS

## 2018-03-20 PROCEDURE — 95951 HC EEG MONITORING VIDEO RECORDING: CPT

## 2018-03-20 RX ADMIN — GABAPENTIN 600 MG: 600 TABLET, FILM COATED ORAL at 17:06

## 2018-03-20 RX ADMIN — BUPRENORPHINE AND NALOXONE 6 TABLET: 2; .5 TABLET SUBLINGUAL at 08:37

## 2018-03-20 RX ADMIN — GABAPENTIN 600 MG: 600 TABLET, FILM COATED ORAL at 08:37

## 2018-03-20 RX ADMIN — PHENYTOIN SODIUM 100 MG: 100 CAPSULE ORAL at 02:29

## 2018-03-20 RX ADMIN — GABAPENTIN 600 MG: 600 TABLET, FILM COATED ORAL at 13:25

## 2018-03-20 RX ADMIN — AMITRIPTYLINE HYDROCHLORIDE 150 MG: 75 TABLET, FILM COATED ORAL at 22:17

## 2018-03-20 RX ADMIN — GABAPENTIN 600 MG: 600 TABLET, FILM COATED ORAL at 22:17

## 2018-03-20 ASSESSMENT — PAIN SCALES - GENERAL
PAINLEVEL_OUTOF10: 0

## 2018-03-20 NOTE — PLAN OF CARE
Problem: Coping:  Goal: Ability to cope will improve  Ability to cope will improve   Outcome: Ongoing    Goal: Ability to identify appropriate support needs will improve  Ability to identify appropriate support needs will improve   Outcome: Ongoing      Problem: Health Behavior:  Goal: Ability to manage health-related needs will improve  Ability to manage health-related needs will improve   Outcome: Ongoing      Problem: Physical Regulation:  Goal: Signs of adequate cerebral perfusion will increase  Signs of adequate cerebral perfusion will increase   Outcome: Ongoing    Goal: Ability to maintain a stable neurologic state will improve  Ability to maintain a stable neurologic state will improve   Outcome: Ongoing      Problem: Safety:  Goal: Ability to remain free from injury will improve  Ability to remain free from injury will improve   Outcome: Ongoing      Problem: Self-Concept:  Goal: Level of anxiety will decrease  Level of anxiety will decrease   Outcome: Ongoing    Goal: Ability to verbalize feelings about condition will improve  Ability to verbalize feelings about condition will improve   Outcome: Ongoing      Problem: Falls - Risk of:  Goal: Will remain free from falls  Will remain free from falls   Outcome: Ongoing    Goal: Absence of physical injury  Absence of physical injury   Outcome: Ongoing      Problem: SAFETY  Goal: Free from accidental physical injury  Outcome: Ongoing    Goal: Free from intentional harm  Outcome: Ongoing

## 2018-03-21 VITALS
DIASTOLIC BLOOD PRESSURE: 83 MMHG | TEMPERATURE: 97.6 F | HEART RATE: 89 BPM | OXYGEN SATURATION: 100 % | RESPIRATION RATE: 16 BRPM | SYSTOLIC BLOOD PRESSURE: 113 MMHG

## 2018-03-21 PROCEDURE — 99217 PR OBSERVATION CARE DISCHARGE MANAGEMENT: CPT | Performed by: PSYCHIATRY & NEUROLOGY

## 2018-03-21 PROCEDURE — 95951 PR EEG MONITORING/VIDEORECORD: CPT | Performed by: PSYCHIATRY & NEUROLOGY

## 2018-03-21 PROCEDURE — G0378 HOSPITAL OBSERVATION PER HR: HCPCS

## 2018-03-21 PROCEDURE — 6360000002 HC RX W HCPCS: Performed by: PSYCHIATRY & NEUROLOGY

## 2018-03-21 PROCEDURE — 6370000000 HC RX 637 (ALT 250 FOR IP): Performed by: PSYCHIATRY & NEUROLOGY

## 2018-03-21 PROCEDURE — 2580000003 HC RX 258: Performed by: PSYCHIATRY & NEUROLOGY

## 2018-03-21 RX ORDER — PHENYTOIN SODIUM 100 MG/1
100 CAPSULE, EXTENDED RELEASE ORAL 3 TIMES DAILY
Status: DISCONTINUED | OUTPATIENT
Start: 2018-03-21 | End: 2018-03-21 | Stop reason: HOSPADM

## 2018-03-21 RX ORDER — LEVETIRACETAM 500 MG/1
1500 TABLET ORAL 2 TIMES DAILY
Status: DISCONTINUED | OUTPATIENT
Start: 2018-03-21 | End: 2018-03-21 | Stop reason: HOSPADM

## 2018-03-21 RX ADMIN — BUPRENORPHINE AND NALOXONE 6 TABLET: 2; .5 TABLET SUBLINGUAL at 09:16

## 2018-03-21 RX ADMIN — PHENYTOIN SODIUM 100 MG: 100 CAPSULE ORAL at 09:16

## 2018-03-21 RX ADMIN — LEVETIRACETAM 1500 MG: 500 TABLET ORAL at 09:15

## 2018-03-21 RX ADMIN — Medication 10 ML: at 09:18

## 2018-03-21 RX ADMIN — GABAPENTIN 600 MG: 600 TABLET, FILM COATED ORAL at 09:15

## 2018-03-21 ASSESSMENT — PAIN SCALES - GENERAL
PAINLEVEL_OUTOF10: 0
PAINLEVEL_OUTOF10: 7
PAINLEVEL_OUTOF10: 0

## 2018-03-21 NOTE — PROCEDURES
ADULT INPATIENT VIDEO-EEG EVENT MONITORING REPORT    Patient:   Carlton Conklin  MR#:    [id number]  Room #:    INPATIENT   YOB: 1990  Study Date:    March 19-21, 2018  Primary Physician:     Keya Ennis   Referring Physician:   Jacqui Mendez DO      CLINICAL INFORMATION:     This patient is a 32 y.o. female with a history of seizures. The specific reason we are doing this study is for event clarification and to evaluate for an underlying seizure focus. MEDICATIONS:     See MAR    RECORDING CONDITIONS:     Continuous video-EEG monitoring was performed with [employer] equiptment. The recorded period extended from March 19-21, 2018. Electrodes were applied according to the International 10-20 System. Data were obtained, stored, and interpreted according to ACNS guidelines (J Clin Neurophysiol 2006;23(2):) utilizing referential montage recording, with reformatting to longitudinal, transverse bipolar, and referential montages as necessary for interpretation, along with digital/automated EEG analysis. The patient tolerated the entire procedure well. DESCRIPTION OF BASELINE RECORD:    During the waking state, the predominant posterior resting frequency was rhythmic, symmetric, 9-10 Hz, 30-40 uV rhythm with reactivity to eye opening and closure demonstrated. Drowsiness was demonstrated by slow rolling eye movements followed by loss of background waking activities. Onset of Stage I sleep was demonstrated by gradual disappearance of background waking rhythms and the onset of gradual symmetric mixed-frequency 4-7 Hz slowing. Stage II sleep was characterized by vertex transients, sleep-spindles, and K-complexes, at times with shifting asymmetry demonstrated. Stage III and IV of sleep were characterized by progressive proportion of high voltage slowing in the delta frequency.  Photic stimulation was performed at frequencies between 1-30 Hz demonstrating symmetric bioccipital driving responses at a wide range

## 2018-03-21 NOTE — PLAN OF CARE
Problem: Coping:  Goal: Ability to cope will improve  Ability to cope will improve   Outcome: Ongoing    Goal: Ability to identify appropriate support needs will improve  Ability to identify appropriate support needs will improve   Outcome: Ongoing      Problem: Health Behavior:  Goal: Ability to manage health-related needs will improve  Ability to manage health-related needs will improve   Outcome: Ongoing      Problem: Physical Regulation:  Goal: Signs of adequate cerebral perfusion will increase  Signs of adequate cerebral perfusion will increase   Outcome: Ongoing    Goal: Ability to maintain a stable neurologic state will improve  Ability to maintain a stable neurologic state will improve   Outcome: Ongoing      Problem: Safety:  Goal: Ability to remain free from injury will improve  Ability to remain free from injury will improve   Outcome: Ongoing      Problem: Self-Concept:  Goal: Level of anxiety will decrease  Level of anxiety will decrease   Outcome: Ongoing    Goal: Ability to verbalize feelings about condition will improve  Ability to verbalize feelings about condition will improve   Outcome: Ongoing      Problem: Falls - Risk of:  Goal: Will remain free from falls  Will remain free from falls   Outcome: Ongoing    Goal: Absence of physical injury  Absence of physical injury   Outcome: Ongoing      Problem: SAFETY  Goal: Free from accidental physical injury  Outcome: Ongoing    Goal: Free from intentional harm  Outcome: Ongoing      Problem: DAILY CARE  Goal: Daily care needs are met  Outcome: Ongoing      Problem: PAIN  Goal: Patient's pain/discomfort is manageable  Outcome: Ongoing      Problem: SKIN INTEGRITY  Goal: Skin integrity is maintained or improved  Outcome: Ongoing      Problem: KNOWLEDGE DEFICIT  Goal: Patient/S.O. demonstrates understanding of disease process, treatment plan, medications, and discharge instructions.   Outcome: Ongoing      Problem: DISCHARGE BARRIERS  Goal: Patient's

## 2018-03-21 NOTE — DISCHARGE SUMMARY
EPILEPSY MONITORING UNIT DISCHARGE SUMMARY      Patient Name: Omar Birmingham    :  1990    MRN:  [id number]  Admit date:  3/19/2018    Discharge date:  3/21/2018  Admitting Physician:  Cate Mcdonald DO    Primary Care Physician:  Xi Martins    Discharge Diagnoses:  Convulsions    Diagnostic Studies: 24 hour video EEG monitoring    Hospital Course: The patient was admitted tablets to monitoring unit. No events were captured. Her preliminary interictal EEG is normal. The patient be discharged home today. She has been restarted on her antiepileptic medications including her Dilantin and Keppra. She'll continue to follow event precautions as outlined below. She'll follow with me in 2 weeks where final antiepileptic drug decisions will be made. Discharge Medications:      [facility] Medication Instructions [facility]:[phone]    Printed on:18 0815   Medication Information                      amitriptyline (ELAVIL) 150 MG tablet  Take 1 tablet by mouth 2 times daily             buprenorphine-naloxone (SUBOXONE) 8-2 MG SUBL SL tablet  TAKE 1.5 (ONE AND A HALF) TABLET BY MOUTH SUBLINGUALLY EVERY DAY             gabapentin (NEURONTIN) 600 MG tablet  600 mg 4 times daily . ibuprofen (ADVIL;MOTRIN) 800 MG tablet  800 mg every 8 hours as needed              levETIRAcetam (KEPPRA) 750 MG tablet  TAKE 2 TABLETS BY MOUTH 2 TIMES DAILY             phenytoin (DILANTIN) 100 MG ER capsule  TAKE 1 CAPSULE BY MOUTH 3 TIMES DAILY                 Discharge Instructions: Follow up with Jose F García in 14 days. Take medications as directed. Resume activity as tolerated. Epilepsy precautions and seizure first aid discussed. No driving, heights, swimming, tub baths, open flames, or heavy machinery. Disposition: Patient is medically stable and will be discharged home. Time spent on discharge 20 min.       Jose F García DO  Board Certified Neurologist

## 2018-04-02 ENCOUNTER — OFFICE VISIT (OUTPATIENT)
Dept: NEUROSURGERY | Age: 28
End: 2018-04-02
Payer: COMMERCIAL

## 2018-04-02 VITALS
BODY MASS INDEX: 33.46 KG/M2 | SYSTOLIC BLOOD PRESSURE: 110 MMHG | HEIGHT: 67 IN | WEIGHT: 213.2 LBS | HEART RATE: 94 BPM | DIASTOLIC BLOOD PRESSURE: 70 MMHG | OXYGEN SATURATION: 99 %

## 2018-04-02 DIAGNOSIS — R56.9 CONVULSIONS, UNSPECIFIED CONVULSION TYPE (HCC): Primary | ICD-10-CM

## 2018-04-02 DIAGNOSIS — R93.0 ABNORMAL MRI OF HEAD: ICD-10-CM

## 2018-04-02 PROCEDURE — 4004F PT TOBACCO SCREEN RCVD TLK: CPT | Performed by: PSYCHIATRY & NEUROLOGY

## 2018-04-02 PROCEDURE — 99214 OFFICE O/P EST MOD 30 MIN: CPT | Performed by: PSYCHIATRY & NEUROLOGY

## 2018-04-02 PROCEDURE — G8427 DOCREV CUR MEDS BY ELIG CLIN: HCPCS | Performed by: PSYCHIATRY & NEUROLOGY

## 2018-04-02 PROCEDURE — G8417 CALC BMI ABV UP PARAM F/U: HCPCS | Performed by: PSYCHIATRY & NEUROLOGY

## 2018-04-12 ENCOUNTER — OFFICE VISIT (OUTPATIENT)
Dept: PRIMARY CARE CLINIC | Age: 28
End: 2018-04-12
Payer: COMMERCIAL

## 2018-04-12 VITALS
OXYGEN SATURATION: 99 % | WEIGHT: 209.8 LBS | DIASTOLIC BLOOD PRESSURE: 86 MMHG | BODY MASS INDEX: 32.93 KG/M2 | HEART RATE: 112 BPM | HEIGHT: 67 IN | SYSTOLIC BLOOD PRESSURE: 134 MMHG

## 2018-04-12 DIAGNOSIS — S06.9X9D TRAUMATIC BRAIN INJURY WITH LOSS OF CONSCIOUSNESS, SUBSEQUENT ENCOUNTER: Primary | ICD-10-CM

## 2018-04-12 DIAGNOSIS — F19.10 SUBSTANCE ABUSE (HCC): ICD-10-CM

## 2018-04-12 DIAGNOSIS — F41.9 ANXIETY: ICD-10-CM

## 2018-04-12 PROCEDURE — 4004F PT TOBACCO SCREEN RCVD TLK: CPT | Performed by: NURSE PRACTITIONER

## 2018-04-12 PROCEDURE — G8427 DOCREV CUR MEDS BY ELIG CLIN: HCPCS | Performed by: NURSE PRACTITIONER

## 2018-04-12 PROCEDURE — 99214 OFFICE O/P EST MOD 30 MIN: CPT | Performed by: NURSE PRACTITIONER

## 2018-04-12 PROCEDURE — G8417 CALC BMI ABV UP PARAM F/U: HCPCS | Performed by: NURSE PRACTITIONER

## 2018-04-12 PROCEDURE — 99408 AUDIT/DAST 15-30 MIN: CPT | Performed by: NURSE PRACTITIONER

## 2018-04-12 ASSESSMENT — ENCOUNTER SYMPTOMS
GASTROINTESTINAL NEGATIVE: 1
RESPIRATORY NEGATIVE: 1
EYES NEGATIVE: 1

## 2018-04-12 ASSESSMENT — PATIENT HEALTH QUESTIONNAIRE - PHQ9
1. LITTLE INTEREST OR PLEASURE IN DOING THINGS: 0
2. FEELING DOWN, DEPRESSED OR HOPELESS: 0
SUM OF ALL RESPONSES TO PHQ QUESTIONS 1-9: 0
SUM OF ALL RESPONSES TO PHQ9 QUESTIONS 1 & 2: 0

## 2018-06-07 ENCOUNTER — TELEPHONE (OUTPATIENT)
Dept: NEUROSURGERY | Age: 28
End: 2018-06-07

## 2018-07-24 ENCOUNTER — OFFICE VISIT (OUTPATIENT)
Dept: NEUROSURGERY | Age: 28
End: 2018-07-24
Payer: COMMERCIAL

## 2018-07-24 ENCOUNTER — TELEPHONE (OUTPATIENT)
Dept: NEUROSURGERY | Age: 28
End: 2018-07-24

## 2018-07-24 VITALS
WEIGHT: 207.2 LBS | BODY MASS INDEX: 32.52 KG/M2 | SYSTOLIC BLOOD PRESSURE: 132 MMHG | OXYGEN SATURATION: 98 % | HEIGHT: 67 IN | DIASTOLIC BLOOD PRESSURE: 84 MMHG | HEART RATE: 115 BPM

## 2018-07-24 DIAGNOSIS — F41.9 ANXIETY: ICD-10-CM

## 2018-07-24 DIAGNOSIS — R20.0 NUMBNESS: ICD-10-CM

## 2018-07-24 DIAGNOSIS — R56.9 CONVULSIONS, UNSPECIFIED CONVULSION TYPE (HCC): Primary | ICD-10-CM

## 2018-07-24 DIAGNOSIS — R56.9 CONVULSIONS, UNSPECIFIED CONVULSION TYPE (HCC): ICD-10-CM

## 2018-07-24 DIAGNOSIS — R93.0 ABNORMAL MRI OF HEAD: ICD-10-CM

## 2018-07-24 LAB
ALBUMIN SERPL-MCNC: 4.3 G/DL (ref 3.5–5.2)
ALP BLD-CCNC: 131 U/L (ref 35–104)
ALT SERPL-CCNC: 8 U/L (ref 5–33)
ANION GAP SERPL CALCULATED.3IONS-SCNC: 12 MMOL/L (ref 7–19)
AST SERPL-CCNC: 13 U/L (ref 5–32)
BASOPHILS ABSOLUTE: 0 K/UL (ref 0–0.2)
BASOPHILS RELATIVE PERCENT: 0.4 % (ref 0–1)
BILIRUB SERPL-MCNC: <0.2 MG/DL (ref 0.2–1.2)
BUN BLDV-MCNC: 6 MG/DL (ref 6–20)
C-REACTIVE PROTEIN: 0.8 MG/DL (ref 0–0.5)
CALCIUM SERPL-MCNC: 9.6 MG/DL (ref 8.6–10)
CHLORIDE BLD-SCNC: 103 MMOL/L (ref 98–111)
CO2: 28 MMOL/L (ref 22–29)
CREAT SERPL-MCNC: 0.7 MG/DL (ref 0.5–0.9)
EOSINOPHILS ABSOLUTE: 0.2 K/UL (ref 0–0.6)
EOSINOPHILS RELATIVE PERCENT: 4 % (ref 0–5)
GFR NON-AFRICAN AMERICAN: >60
GLUCOSE BLD-MCNC: 91 MG/DL (ref 74–109)
HCT VFR BLD CALC: 41.3 % (ref 37–47)
HEMOGLOBIN: 13 G/DL (ref 12–16)
LYMPHOCYTES ABSOLUTE: 1.9 K/UL (ref 1.1–4.5)
LYMPHOCYTES RELATIVE PERCENT: 33.8 % (ref 20–40)
MCH RBC QN AUTO: 26.6 PG (ref 27–31)
MCHC RBC AUTO-ENTMCNC: 31.5 G/DL (ref 33–37)
MCV RBC AUTO: 84.6 FL (ref 81–99)
MONOCYTES ABSOLUTE: 0.3 K/UL (ref 0–0.9)
MONOCYTES RELATIVE PERCENT: 5.2 % (ref 0–10)
NEUTROPHILS ABSOLUTE: 3.1 K/UL (ref 1.5–7.5)
NEUTROPHILS RELATIVE PERCENT: 56.2 % (ref 50–65)
PDW BLD-RTO: 13.7 % (ref 11.5–14.5)
PLATELET # BLD: 191 K/UL (ref 130–400)
PMV BLD AUTO: 11.7 FL (ref 9.4–12.3)
POTASSIUM SERPL-SCNC: 3.9 MMOL/L (ref 3.5–5)
RBC # BLD: 4.88 M/UL (ref 4.2–5.4)
RHEUMATOID FACTOR: <10 IU/ML
SODIUM BLD-SCNC: 143 MMOL/L (ref 136–145)
T4 FREE: 1 NG/DL (ref 0.9–1.7)
TOTAL PROTEIN: 7.6 G/DL (ref 6.6–8.7)
TSH SERPL DL<=0.05 MIU/L-ACNC: 5.02 UIU/ML (ref 0.27–4.2)
VITAMIN B-12: 419 PG/ML (ref 211–946)
WBC # BLD: 5.5 K/UL (ref 4.8–10.8)

## 2018-07-24 PROCEDURE — 4004F PT TOBACCO SCREEN RCVD TLK: CPT | Performed by: PSYCHIATRY & NEUROLOGY

## 2018-07-24 PROCEDURE — 99214 OFFICE O/P EST MOD 30 MIN: CPT | Performed by: PSYCHIATRY & NEUROLOGY

## 2018-07-24 PROCEDURE — G8417 CALC BMI ABV UP PARAM F/U: HCPCS | Performed by: PSYCHIATRY & NEUROLOGY

## 2018-07-24 PROCEDURE — G8427 DOCREV CUR MEDS BY ELIG CLIN: HCPCS | Performed by: PSYCHIATRY & NEUROLOGY

## 2018-07-24 RX ORDER — CLONAZEPAM 0.5 MG/1
0.5 TABLET ORAL 2 TIMES DAILY PRN
Qty: 60 TABLET | Refills: 5 | Status: ON HOLD | OUTPATIENT
Start: 2018-07-24 | End: 2019-09-19

## 2018-07-24 NOTE — PROGRESS NOTES
marked  Sleep: [x] Insomnia [] Snoring [] Restless Legs [] Sleep Apnea  [x] Daytime Sleepiness  [x] Denies all unless marked  Skin:[] Rash [] Skin Discoloration [x] Denies all unless marked   Neurological: [x]Visual Disturbance/Memory Loss [] Loss of Balance [] Slurred Speech/Weakness [] Seizures  [] Vertigo/Dizziness [x] Denies all unless marked    ROS reviewed, agree with above. PHYSICAL EXAM  Constitutional    /84   Pulse 115   Ht 5' 7\" (1.702 m)   Wt 207 lb 3.2 oz (94 kg)   LMP 07/03/2018   SpO2 98%   BMI 32.45 kg/m²   General appearance: No acute distress   EYES -   Conjunctiva normal  Pupillary exam as below, see CN exam in the neurologic exam  ENT-    No scars, masses, or lesions over external nose or ears  Hearing normal bilaterally to finger rub  Cardiovascular -   RRR  No clubbing, cyanosis, or edema   Respiratory-   Good expansion, normal effort without use of accessory muscles  CTA  Musculoskeletal    No significant wasting of muscles noted  Gait as below, see gait exam in the neurologic exam  Muscle strength, tone, stability as below. No bony deformities  Skin    Warm, dry, and intact to inspection and palpation. No rash, erythema, or pallor  Psychiatric    Mood, affect, and behavior appear normal    Memory as below see mental status examination in the neurologic exam      NEUROLOGICAL EXAM    Mental status   [x]Awake, alert, oriented   [x]Affect attention and concentration appear appropriate  [x]Recent and remote memory appears unremarkable  [x]Speech normal without dysarthria or aphasia, comprehension and repetition intact.    COMMENTS:    Cranial Nerves [x]No VF deficit to confrontation,  no papilledema on fundoscopic exam.  [x]PERRLA, EOMI, no nystagmus, conjugate eye movements, no ptosis  [x]Face symmetric  [x]Facial sensation intact  [x]Tongue midline no atrophy or fasciculations present  [x]Palate midline, hearing to finger rub normal bilaterally  [x]Shoulder shrug and SCM testing normal bilaterally  COMMENTS:   Motor   [x]5/5 strength x 4 extremities  [x]Normal bulk and tone  [x]No tremor present  [x]No rigidity or bradykinesia noted  COMMENTS:   Sensory  [x]Sensation intact to light touch, pin prick, vibration, and proprioception BLE  []Sensation intact to light touch, pin prick, vibration, and proprioception BUE  COMMENTS:   Coordination [x]FTN normal bilaterally   []HTS normal bilaterally  []LEXI normal bilaterally. COMMENTS:   Reflexes  [x]Symmetric and non-pathological  [x]Toes down going bilaterally  [x]No clonus present  COMMENTS:   Gait                  [x]Normal steady gait    []Ataxic    []Spastic     []Magnetic     []Shuffling  COMMENTS:       LABS RECORD AND IMAGING REVIEW (As below and per HPI)    Records reviewed. Prior repeat MRI stable unchanged. Video EEG monitoring was normal, no events captured. ASSESSMENT:    Evangelist Delcid is a 29y.o. year old female here for possible seizures, prior history of TBI. Video EEG monitoring was normal, no events captured. Keppra increased previously and doing better overall at 1500 mg bid, now off Dilantin. Still noting intermittent staring episodes, and some events seem to be anxiety induced. MRI abnormal with nonspecific white matter changes, repeat stable from previous. Noting numbness in feet and hands. PLAN:  1. Repeat MRI Brain to reassess white matter lesion(s), unclear if demyelinating, no enhancement. Hold off on LP for now as clinically/symptomatically MS is felt unlikely but the lesion is somewhat atypical, needs continued follow up. 2.  Continue Keppra 1500 mg bid. Resume klonopin for anxiety, worsening given recent stressors with sick child, start 0.5 mg bid. On suboxone, will keep dosing low. Consider psych referral if worsens. 3.  Epilepsy precautions and seizure first aid discussed. No driving, heights, swimming, tub baths, open flames, or heavy machinery.     4.  Additional labs, NCS/EMG, consider spinal imaging pending and if worsens.       Lauren Jones,   Board Certified Neurology

## 2018-07-24 NOTE — TELEPHONE ENCOUNTER
Called patient no answer on first number then called and left voice mail for patient to call back per Dr Norbert Carrasco to go over labs

## 2018-07-24 NOTE — TELEPHONE ENCOUNTER
----- Message from Gloria Robbins DO sent at 7/24/2018 12:55 PM CDT -----  Mild thyroid abnormality noted in the labs, follow up with primary.

## 2018-07-26 LAB
LYME (B. BURGDORFERI) AB IGG WB: NEGATIVE
LYME AB IGM BY WB:: NEGATIVE
RPR: NORMAL

## 2018-07-27 LAB
ALBUMIN SERPL-MCNC: 4.47 G/DL (ref 3.75–5.01)
ALPHA-1-GLOBULIN: 0.36 G/DL (ref 0.19–0.46)
ALPHA-2-GLOBULIN: 0.95 G/DL (ref 0.48–1.05)
ARSENIC BLOOD: <10 UG/L (ref 0–13)
BETA GLOBULIN: 0.96 G/DL (ref 0.48–1.1)
GAMMA GLOBULIN: 0.95 G/DL (ref 0.62–1.51)
LEAD LEVEL BLOOD: <2 UG/DL (ref 0–4.9)
MERCURY BLOOD: <3 UG/L (ref 0–10)
PROTEIN ELECTROPHORESIS, SERUM: NORMAL
SPE/IFE INTERPRETATION: NORMAL
TOTAL PROTEIN: 7.7 G/DL (ref 6–8.3)

## 2018-08-28 RX ORDER — LEVETIRACETAM 750 MG/1
1500 TABLET ORAL 2 TIMES DAILY
Qty: 120 TABLET | Refills: 5 | Status: ON HOLD | OUTPATIENT
Start: 2018-08-28 | End: 2019-09-20 | Stop reason: HOSPADM

## 2018-11-01 ENCOUNTER — HOSPITAL ENCOUNTER (OUTPATIENT)
Dept: NEUROLOGY | Age: 28
Discharge: HOME | End: 2018-11-01
Payer: COMMERCIAL

## 2018-11-01 DIAGNOSIS — R20.0 NUMBNESS: ICD-10-CM

## 2018-11-01 DIAGNOSIS — R93.0 ABNORMAL MRI OF HEAD: ICD-10-CM

## 2018-11-01 DIAGNOSIS — F41.9 ANXIETY: ICD-10-CM

## 2018-11-01 DIAGNOSIS — R56.9 CONVULSIONS, UNSPECIFIED CONVULSION TYPE (HCC): ICD-10-CM

## 2018-11-01 PROCEDURE — 95910 NRV CNDJ TEST 7-8 STUDIES: CPT

## 2018-11-01 PROCEDURE — 95886 MUSC TEST DONE W/N TEST COMP: CPT | Performed by: PSYCHIATRY & NEUROLOGY

## 2018-11-01 PROCEDURE — 95886 MUSC TEST DONE W/N TEST COMP: CPT

## 2018-11-01 PROCEDURE — 95910 NRV CNDJ TEST 7-8 STUDIES: CPT | Performed by: PSYCHIATRY & NEUROLOGY

## 2019-06-26 ENCOUNTER — HOSPITAL ENCOUNTER (EMERGENCY)
Age: 29
Discharge: HOME | End: 2019-06-26
Attending: EMERGENCY MEDICINE
Payer: COMMERCIAL

## 2019-06-26 ENCOUNTER — OFFICE VISIT (OUTPATIENT)
Dept: NEUROSURGERY | Age: 29
End: 2019-06-26
Payer: COMMERCIAL

## 2019-06-26 VITALS
OXYGEN SATURATION: 98 % | WEIGHT: 225 LBS | RESPIRATION RATE: 18 BRPM | SYSTOLIC BLOOD PRESSURE: 131 MMHG | TEMPERATURE: 98.6 F | BODY MASS INDEX: 35.31 KG/M2 | HEIGHT: 67 IN | DIASTOLIC BLOOD PRESSURE: 79 MMHG | HEART RATE: 95 BPM

## 2019-06-26 VITALS
OXYGEN SATURATION: 98 % | BODY MASS INDEX: 32.18 KG/M2 | DIASTOLIC BLOOD PRESSURE: 88 MMHG | SYSTOLIC BLOOD PRESSURE: 148 MMHG | WEIGHT: 205 LBS | HEIGHT: 67 IN | HEART RATE: 108 BPM

## 2019-06-26 DIAGNOSIS — G40.909 SEIZURE DISORDER (HCC): Primary | ICD-10-CM

## 2019-06-26 DIAGNOSIS — R56.9 CONVULSIONS, UNSPECIFIED CONVULSION TYPE (HCC): Primary | ICD-10-CM

## 2019-06-26 DIAGNOSIS — F41.9 ANXIETY: ICD-10-CM

## 2019-06-26 DIAGNOSIS — R41.0 DISORIENTATION: ICD-10-CM

## 2019-06-26 DIAGNOSIS — R93.0 ABNORMAL MRI OF HEAD: ICD-10-CM

## 2019-06-26 LAB
ALBUMIN SERPL-MCNC: 4.5 G/DL (ref 3.5–5.2)
ALP BLD-CCNC: 130 U/L (ref 35–104)
ALT SERPL-CCNC: 10 U/L (ref 5–33)
AMPHETAMINE SCREEN, URINE: NEGATIVE
ANION GAP SERPL CALCULATED.3IONS-SCNC: 13 MMOL/L (ref 7–19)
AST SERPL-CCNC: 14 U/L (ref 5–32)
BACTERIA: NEGATIVE /HPF
BARBITURATE SCREEN URINE: NEGATIVE
BASOPHILS ABSOLUTE: 0 K/UL (ref 0–0.2)
BASOPHILS RELATIVE PERCENT: 0.2 % (ref 0–1)
BENZODIAZEPINE SCREEN, URINE: NEGATIVE
BILIRUB SERPL-MCNC: <0.2 MG/DL (ref 0.2–1.2)
BILIRUBIN URINE: NEGATIVE
BLOOD, URINE: ABNORMAL
BUN BLDV-MCNC: 5 MG/DL (ref 6–20)
CALCIUM SERPL-MCNC: 9.1 MG/DL (ref 8.6–10)
CANNABINOID SCREEN URINE: POSITIVE
CHLORIDE BLD-SCNC: 101 MMOL/L (ref 98–111)
CLARITY: CLEAR
CO2: 28 MMOL/L (ref 22–29)
COCAINE METABOLITE SCREEN URINE: NEGATIVE
COLOR: YELLOW
CREAT SERPL-MCNC: 0.9 MG/DL (ref 0.5–0.9)
EOSINOPHILS ABSOLUTE: 0 K/UL (ref 0–0.6)
EOSINOPHILS RELATIVE PERCENT: 0.3 % (ref 0–5)
EPITHELIAL CELLS, UA: 7 /HPF (ref 0–5)
ETHANOL: <10 MG/DL (ref 0–0.08)
GFR NON-AFRICAN AMERICAN: >60
GLUCOSE BLD-MCNC: 112 MG/DL (ref 74–109)
GLUCOSE URINE: NEGATIVE MG/DL
HCG QUALITATIVE: NEGATIVE
HCT VFR BLD CALC: 40.9 % (ref 37–47)
HEMOGLOBIN: 13 G/DL (ref 12–16)
HYALINE CASTS: 0 /HPF (ref 0–8)
KETONES, URINE: NEGATIVE MG/DL
LEUKOCYTE ESTERASE, URINE: ABNORMAL
LYMPHOCYTES ABSOLUTE: 1.7 K/UL (ref 1.1–4.5)
LYMPHOCYTES RELATIVE PERCENT: 19.2 % (ref 20–40)
Lab: ABNORMAL
MCH RBC QN AUTO: 28.1 PG (ref 27–31)
MCHC RBC AUTO-ENTMCNC: 31.8 G/DL (ref 33–37)
MCV RBC AUTO: 88.3 FL (ref 81–99)
MONOCYTES ABSOLUTE: 0.4 K/UL (ref 0–0.9)
MONOCYTES RELATIVE PERCENT: 4.5 % (ref 0–10)
NEUTROPHILS ABSOLUTE: 6.7 K/UL (ref 1.5–7.5)
NEUTROPHILS RELATIVE PERCENT: 75.6 % (ref 50–65)
NITRITE, URINE: NEGATIVE
OPIATE SCREEN URINE: NEGATIVE
PDW BLD-RTO: 14.6 % (ref 11.5–14.5)
PH UA: 6 (ref 5–8)
PLATELET # BLD: 200 K/UL (ref 130–400)
PMV BLD AUTO: 11.4 FL (ref 9.4–12.3)
POTASSIUM REFLEX MAGNESIUM: 3.9 MMOL/L (ref 3.5–5)
PROTEIN UA: NEGATIVE MG/DL
RBC # BLD: 4.63 M/UL (ref 4.2–5.4)
RBC UA: 2 /HPF (ref 0–4)
SODIUM BLD-SCNC: 142 MMOL/L (ref 136–145)
SPECIFIC GRAVITY UA: 1.02 (ref 1–1.03)
TOTAL PROTEIN: 7.7 G/DL (ref 6.6–8.7)
URINE REFLEX TO CULTURE: YES
UROBILINOGEN, URINE: 0.2 E.U./DL
WBC # BLD: 8.9 K/UL (ref 4.8–10.8)
WBC UA: 6 /HPF (ref 0–5)

## 2019-06-26 PROCEDURE — 96374 THER/PROPH/DIAG INJ IV PUSH: CPT

## 2019-06-26 PROCEDURE — 36415 COLL VENOUS BLD VENIPUNCTURE: CPT

## 2019-06-26 PROCEDURE — G8427 DOCREV CUR MEDS BY ELIG CLIN: HCPCS | Performed by: PSYCHIATRY & NEUROLOGY

## 2019-06-26 PROCEDURE — G0480 DRUG TEST DEF 1-7 CLASSES: HCPCS

## 2019-06-26 PROCEDURE — 87086 URINE CULTURE/COLONY COUNT: CPT

## 2019-06-26 PROCEDURE — 6360000002 HC RX W HCPCS

## 2019-06-26 PROCEDURE — 99284 EMERGENCY DEPT VISIT MOD MDM: CPT

## 2019-06-26 PROCEDURE — 93005 ELECTROCARDIOGRAM TRACING: CPT

## 2019-06-26 PROCEDURE — 84703 CHORIONIC GONADOTROPIN ASSAY: CPT

## 2019-06-26 PROCEDURE — 80053 COMPREHEN METABOLIC PANEL: CPT

## 2019-06-26 PROCEDURE — 81001 URINALYSIS AUTO W/SCOPE: CPT

## 2019-06-26 PROCEDURE — G8417 CALC BMI ABV UP PARAM F/U: HCPCS | Performed by: PSYCHIATRY & NEUROLOGY

## 2019-06-26 PROCEDURE — 80307 DRUG TEST PRSMV CHEM ANLYZR: CPT

## 2019-06-26 PROCEDURE — 99284 EMERGENCY DEPT VISIT MOD MDM: CPT | Performed by: EMERGENCY MEDICINE

## 2019-06-26 PROCEDURE — 85025 COMPLETE CBC W/AUTO DIFF WBC: CPT

## 2019-06-26 PROCEDURE — 99214 OFFICE O/P EST MOD 30 MIN: CPT | Performed by: PSYCHIATRY & NEUROLOGY

## 2019-06-26 PROCEDURE — 4004F PT TOBACCO SCREEN RCVD TLK: CPT | Performed by: PSYCHIATRY & NEUROLOGY

## 2019-06-26 RX ORDER — CITALOPRAM 20 MG/1
20 TABLET ORAL DAILY
Qty: 30 TABLET | Refills: 5 | Status: ON HOLD | OUTPATIENT
Start: 2019-06-26 | End: 2019-09-18

## 2019-06-26 RX ORDER — LORAZEPAM 2 MG/ML
2 INJECTION INTRAMUSCULAR ONCE
Status: COMPLETED | OUTPATIENT
Start: 2019-06-26 | End: 2019-06-26

## 2019-06-26 RX ORDER — LORAZEPAM 2 MG/ML
INJECTION INTRAMUSCULAR
Status: COMPLETED
Start: 2019-06-26 | End: 2019-06-26

## 2019-06-26 RX ADMIN — LORAZEPAM 2 MG: 2 INJECTION INTRAMUSCULAR at 11:55

## 2019-06-26 RX ADMIN — LORAZEPAM 2 MG: 2 INJECTION INTRAMUSCULAR; INTRAVENOUS at 11:55

## 2019-06-26 NOTE — PROGRESS NOTES
file    Number of children: Not on file    Years of education: Not on file    Highest education level: Not on file   Occupational History    Not on file   Social Needs    Financial resource strain: Not on file    Food insecurity:     Worry: Not on file     Inability: Not on file    Transportation needs:     Medical: Not on file     Non-medical: Not on file   Tobacco Use    Smoking status: Current Some Day Smoker     Packs/day: 0.50    Smokeless tobacco: Never Used   Substance and Sexual Activity    Alcohol use: No    Drug use: No    Sexual activity: Yes     Partners: Male   Lifestyle    Physical activity:     Days per week: Not on file     Minutes per session: Not on file    Stress: Not on file   Relationships    Social connections:     Talks on phone: Not on file     Gets together: Not on file     Attends Zoroastrian service: Not on file     Active member of club or organization: Not on file     Attends meetings of clubs or organizations: Not on file     Relationship status: Not on file    Intimate partner violence:     Fear of current or ex partner: Not on file     Emotionally abused: Not on file     Physically abused: Not on file     Forced sexual activity: Not on file   Other Topics Concern    Not on file   Social History Narrative    Not on file       Current Outpatient Medications   Medication Sig Dispense Refill    levETIRAcetam (KEPPRA) 750 MG tablet Take 2 tablets by mouth 2 times daily 120 tablet 5    clonazePAM (KLONOPIN) 0.5 MG tablet Take 1 tablet by mouth 2 times daily as needed for Anxiety for up to 30 days. . 60 tablet 5    gabapentin (NEURONTIN) 600 MG tablet 600 mg 4 times daily .  buprenorphine-naloxone (SUBOXONE) 8-2 MG SUBL SL tablet TAKE 1.5 (ONE AND A HALF) TABLET BY MOUTH SUBLINGUALLY EVERY DAY  0    ibuprofen (ADVIL;MOTRIN) 800 MG tablet 800 mg every 8 hours as needed        No current facility-administered medications for this visit.       ALLERGIES  No Known Allergies      REVIEW OF SYSTEMS    Constitutional: []Fever []Sweat []Chills [x] Recent Injury [x] Denies all unless marked  HEENT:[]Headache  [] Head Injury/Hearing Loss  [] Sore Throat  [x] Ear Ache/Dizziness  [x] Denies all unless marked  Spine:  [x] Neck pain  [x] Back pain  [x] Sciaticia  [x] Denies all unless marked  Cardiovascular:[]Heart Disease []Chest Pain [] Palpitations  [x] Denies all unless marked  Pulmonary: []Shortness of Breath []Cough   [x] Denies all unless marke  Gastrointestinal: []Nausea  []Vomiting  []Abdominal Pain  []Constipation  []Diarrhea  []Dark Bloody Stools  [x] Denies all unless marked  Psychiatric/Behavioral:[x] Depression [x] Anxiety [x] Denies all unless marked  Genitourinary:   [x] Frequency  [] Urgency  [x] Incontinence [] Pain with Urination  [x] Denies all unless marked  Extremities: []Pain  []Swelling  [x] Denies all unless marked  Musculoskeletal: [x] Muscle Pain  [x] Joint Pain  [] Arthritis [x] Muscle Cramps [x] Muscle Twitches  [x] Denies all unless marked  Sleep: [] Insomnia [] Snoring [] Restless Legs [] Sleep Apnea  [] Daytime Sleepiness  [x] Denies all unless marked  Skin:[x] Rash [x] Skin Discoloration [x] Denies all unless marked   Neurological: []Visual Disturbance/Memory Loss [] Loss of Balance [x] Slurred Speech/Weakness [x] Seizures  [x] Vertigo/Dizziness [x] Denies all unless marked      ROS reviewed, agree with above.        PHYSICAL EXAM  Constitutional -   BP (!) 148/88   Pulse 108   Ht 5' 7\" (1.702 m)   Wt 205 lb (93 kg)   LMP 06/05/2019   SpO2 98%   BMI 32.11 kg/m²   General appearance: No acute distress   EYES -   Conjunctiva normal  Pupillary exam as below, see CN exam in the neurologic exam  ENT-    No scars, masses, or lesions over external nose or ears  Hearing normal bilaterally to finger rub  Cardiovascular -   RRR  No clubbing, cyanosis, or edema   Respiratory-   Good expansion, normal effort without use of accessory muscles  CTA  Musculoskeletal -   No significant wasting of muscles noted  Gait as below, see gait exam in the neurologic exam  Muscle strength, tone, stability as below. No bony deformities  Skin -   Warm, dry, and intact to inspection and palpation. No rash, erythema, or pallor  Psychiatric -   Mood, affect, and behavior appear normal    Memory as below see mental status examination in the neurologic exam      NEUROLOGICAL EXAM    Mental status   [x]Awake, alert, oriented   [x]Affect attention and concentration appear appropriate  [x]Recent and remote memory appears unremarkable  [x]Speech normal without dysarthria or aphasia, comprehension and repetition intact. COMMENTS:    Cranial Nerves [x]No VF deficit to confrontation  [x]PERRLA, EOMI, no nystagmus, conjugate eye movements, no ptosis  [x]Face symmetric  [x]Facial sensation intact  [x]Tongue midline no atrophy or fasciculations present  [x]Palate midline, hearing to finger rub normal bilaterally  [x]Shoulder shrug and SCM testing normal bilaterally  COMMENTS:   Motor   [x]5/5 strength x 4 extremities  [x]Normal bulk and tone  [x]No tremor present  [x]No rigidity or bradykinesia noted  COMMENTS:   Sensory  [x]Sensation intact to light touch, pin prick, vibration, and proprioception BLE  []Sensation intact to light touch, pin prick, vibration, and proprioception BUE  COMMENTS:   Coordination [x]FTN normal bilaterally   []HTS normal bilaterally  []LEXI normal bilaterally. COMMENTS:   Reflexes  [x]Symmetric and non-pathological  [x]Toes down going bilaterally  [x]No clonus present  COMMENTS:   Gait                  [x]Normal steady gait    []Ataxic    []Spastic     []Magnetic     []Shuffling  COMMENTS:       LABS RECORD AND IMAGING REVIEW (As below and per HPI)    Records reviewed. Prior repeat MRI stable unchanged. Video EEG monitoring was normal, no events captured.        ASSESSMENT:    Jose Moreira is a 34y.o. year old female here for
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
## 2019-06-26 NOTE — ED NOTES
Pt brought to the triage area by security. They report that the patient was wondering around by [facility] Care. She appeared confused and altered. When staff tried to talk to the patient she would not engage them. Pt appeared altered. Pt is clenching her fist while in triage and appears agitated by moving her hands repetitively. Pt's pupils are dilated at 6mm and slow to respond. Her speech is forced. Pt denies taking any medications. Pt denies any SI or HI. Jaw is clenched when not speaking.        Markus Cornejo  06/26/19 1111

## 2019-06-28 ENCOUNTER — TELEPHONE (OUTPATIENT)
Dept: NEUROLOGY | Age: 29
End: 2019-06-28

## 2019-06-28 LAB
EKG P AXIS: 59 DEGREES
EKG P-R INTERVAL: 170 MS
EKG Q-T INTERVAL: 334 MS
EKG QRS DURATION: 104 MS
EKG QTC CALCULATION (BAZETT): 421 MS
EKG T AXIS: -19 DEGREES
URINE CULTURE, ROUTINE: NORMAL

## 2019-06-28 NOTE — TELEPHONE ENCOUNTER
Called patient no vm ,also tried reaching patient on her mom's cell , did speak with her mother asked her to ask patient to call the office.

## 2019-06-28 NOTE — TELEPHONE ENCOUNTER
Patient left a msg stating \"I just got out of the hospital and was wondering if Dr Missy Franklin could go ahead and refill my klonipin and also my suboxone just this once because I can't get in to see my regular doctor or my suboxone doctor for 2 more weeks. \"      Per Dr Elias's ofc note, he was aware of the patient taking the klonipin and was ok with that but it was up to her pcp to fill. Suboxone was of course not addressed since he does not manage that at all. Also, she was not an in patient in the hospital, she was seen in the ER after she saw Dr Missy Franklin in office both of which occurred on 6/26/19. Please advise.

## 2019-07-02 ENCOUNTER — TELEPHONE (OUTPATIENT)
Dept: NEUROSURGERY | Age: 29
End: 2019-07-02

## 2019-07-22 DIAGNOSIS — F41.9 ANXIETY: Primary | ICD-10-CM

## 2019-07-22 DIAGNOSIS — F19.10 SUBSTANCE ABUSE (HCC): ICD-10-CM

## 2019-07-22 DIAGNOSIS — R56.9 CONVULSIONS, UNSPECIFIED CONVULSION TYPE (HCC): ICD-10-CM

## 2019-08-02 ENCOUNTER — TELEPHONE (OUTPATIENT)
Dept: PSYCHIATRY | Age: 29
End: 2019-08-02

## 2019-08-02 NOTE — TELEPHONE ENCOUNTER
Received referral for [facility] and currently not accepting new pt's for med management. Spoke to Ze and she voiced understanding. [address] [place] [address] Dr Kamille Hand contact information given. Phone number [phone] and fax number [phone].

## 2019-09-13 ENCOUNTER — TELEPHONE (OUTPATIENT)
Dept: NEUROLOGY | Age: 29
End: 2019-09-13

## 2019-09-13 NOTE — TELEPHONE ENCOUNTER
Patient would like to downing from Evy Gimenez to Dr. Jewels Skelton. Please call [facility] if this approved with an appt date and time. [phone].   Thank you

## 2019-09-18 ENCOUNTER — HOSPITAL ENCOUNTER (INPATIENT)
Age: 29
LOS: 2 days | Discharge: HOME | DRG: 101 | End: 2019-09-20
Attending: HOSPITALIST | Admitting: HOSPITALIST
Payer: COMMERCIAL

## 2019-09-18 PROCEDURE — 2700000000 HC OXYGEN THERAPY PER DAY

## 2019-09-18 PROCEDURE — 94002 VENT MGMT INPAT INIT DAY: CPT

## 2019-09-18 PROCEDURE — 2000000000 HC ICU R&B

## 2019-09-18 RX ORDER — SODIUM CHLORIDE 0.9 % (FLUSH) 0.9 %
10 SYRINGE (ML) INJECTION EVERY 12 HOURS SCHEDULED
Status: DISCONTINUED | OUTPATIENT
Start: 2019-09-19 | End: 2019-09-20 | Stop reason: HOSPADM

## 2019-09-18 RX ORDER — POTASSIUM CHLORIDE 7.45 MG/ML
10 INJECTION INTRAVENOUS PRN
Status: DISCONTINUED | OUTPATIENT
Start: 2019-09-18 | End: 2019-09-20 | Stop reason: HOSPADM

## 2019-09-18 RX ORDER — CITALOPRAM 20 MG/1
20 TABLET ORAL DAILY
Status: DISCONTINUED | OUTPATIENT
Start: 2019-09-19 | End: 2019-09-19

## 2019-09-18 RX ORDER — LORAZEPAM 2 MG/ML
1 INJECTION INTRAMUSCULAR EVERY 4 HOURS PRN
Status: DISCONTINUED | OUTPATIENT
Start: 2019-09-18 | End: 2019-09-20 | Stop reason: HOSPADM

## 2019-09-18 RX ORDER — POTASSIUM CHLORIDE 20 MEQ/1
40 TABLET, EXTENDED RELEASE ORAL PRN
Status: DISCONTINUED | OUTPATIENT
Start: 2019-09-18 | End: 2019-09-20 | Stop reason: HOSPADM

## 2019-09-18 RX ORDER — BUPRENORPHINE HYDROCHLORIDE AND NALOXONE HYDROCHLORIDE DIHYDRATE 8; 2 MG/1; MG/1
1 TABLET SUBLINGUAL DAILY
Status: DISCONTINUED | OUTPATIENT
Start: 2019-09-19 | End: 2019-09-19

## 2019-09-18 RX ORDER — MAGNESIUM SULFATE 1 G/100ML
1 INJECTION INTRAVENOUS PRN
Status: DISCONTINUED | OUTPATIENT
Start: 2019-09-18 | End: 2019-09-20 | Stop reason: HOSPADM

## 2019-09-18 RX ORDER — ACETAMINOPHEN 325 MG/1
650 TABLET ORAL EVERY 4 HOURS PRN
Status: DISCONTINUED | OUTPATIENT
Start: 2019-09-18 | End: 2019-09-20 | Stop reason: HOSPADM

## 2019-09-18 RX ORDER — SODIUM CHLORIDE 0.9 % (FLUSH) 0.9 %
10 SYRINGE (ML) INJECTION PRN
Status: DISCONTINUED | OUTPATIENT
Start: 2019-09-18 | End: 2019-09-20 | Stop reason: HOSPADM

## 2019-09-18 RX ORDER — SODIUM CHLORIDE 9 MG/ML
INJECTION, SOLUTION INTRAVENOUS CONTINUOUS
Status: DISCONTINUED | OUTPATIENT
Start: 2019-09-19 | End: 2019-09-19

## 2019-09-18 RX ORDER — LEVETIRACETAM 500 MG/1
1500 TABLET ORAL 2 TIMES DAILY
Status: DISCONTINUED | OUTPATIENT
Start: 2019-09-19 | End: 2019-09-19

## 2019-09-18 RX ORDER — ONDANSETRON 2 MG/ML
4 INJECTION INTRAMUSCULAR; INTRAVENOUS EVERY 6 HOURS PRN
Status: DISCONTINUED | OUTPATIENT
Start: 2019-09-18 | End: 2019-09-20 | Stop reason: HOSPADM

## 2019-09-18 ASSESSMENT — PULMONARY FUNCTION TESTS
PIF_VALUE: 34.9
PIF_VALUE: 36.5
PIF_VALUE: 31.3

## 2019-09-19 PROBLEM — F39 MOOD DISORDER (HCC): Status: ACTIVE | Noted: 2019-09-19

## 2019-09-19 LAB
AMPHETAMINE SCREEN, URINE: NEGATIVE
ANION GAP SERPL CALCULATED.3IONS-SCNC: 9 MMOL/L (ref 7–19)
BACTERIA: NEGATIVE /HPF
BARBITURATE SCREEN URINE: NEGATIVE
BASOPHILS ABSOLUTE: 0 K/UL (ref 0–0.2)
BASOPHILS RELATIVE PERCENT: 0.5 % (ref 0–1)
BENZODIAZEPINE SCREEN, URINE: POSITIVE
BILIRUBIN URINE: NEGATIVE
BLOOD, URINE: NEGATIVE
BUN BLDV-MCNC: 4 MG/DL (ref 6–20)
CALCIUM SERPL-MCNC: 8.2 MG/DL (ref 8.6–10)
CANNABINOID SCREEN URINE: POSITIVE
CHLORIDE BLD-SCNC: 108 MMOL/L (ref 98–111)
CLARITY: CLEAR
CO2: 27 MMOL/L (ref 22–29)
COCAINE METABOLITE SCREEN URINE: NEGATIVE
COLOR: YELLOW
CREAT SERPL-MCNC: 0.6 MG/DL (ref 0.5–0.9)
EOSINOPHILS ABSOLUTE: 0.1 K/UL (ref 0–0.6)
EOSINOPHILS RELATIVE PERCENT: 1.6 % (ref 0–5)
EPITHELIAL CELLS, UA: 3 /HPF (ref 0–5)
GFR NON-AFRICAN AMERICAN: >60
GLUCOSE BLD-MCNC: 88 MG/DL (ref 74–109)
GLUCOSE URINE: NEGATIVE MG/DL
HCT VFR BLD CALC: 34.2 % (ref 37–47)
HEMOGLOBIN: 11 G/DL (ref 12–16)
HYALINE CASTS: 2 /HPF (ref 0–8)
IMMATURE GRANULOCYTES #: 0 K/UL
KETONES, URINE: NEGATIVE MG/DL
LEUKOCYTE ESTERASE, URINE: ABNORMAL
LYMPHOCYTES ABSOLUTE: 2.3 K/UL (ref 1.1–4.5)
LYMPHOCYTES RELATIVE PERCENT: 35.2 % (ref 20–40)
Lab: ABNORMAL
MAGNESIUM: 2.1 MG/DL (ref 1.6–2.6)
MCH RBC QN AUTO: 28.6 PG (ref 27–31)
MCHC RBC AUTO-ENTMCNC: 32.2 G/DL (ref 33–37)
MCV RBC AUTO: 89.1 FL (ref 81–99)
MONOCYTES ABSOLUTE: 0.4 K/UL (ref 0–0.9)
MONOCYTES RELATIVE PERCENT: 5.6 % (ref 0–10)
NEUTROPHILS ABSOLUTE: 3.6 K/UL (ref 1.5–7.5)
NEUTROPHILS RELATIVE PERCENT: 56.6 % (ref 50–65)
NITRITE, URINE: NEGATIVE
OPIATE SCREEN URINE: POSITIVE
PDW BLD-RTO: 14.5 % (ref 11.5–14.5)
PH UA: 6.5 (ref 5–8)
PHENYTOIN LEVEL: 1.6 UG/ML (ref 10–20)
PLATELET # BLD: 162 K/UL (ref 130–400)
PMV BLD AUTO: 11.7 FL (ref 9.4–12.3)
POTASSIUM REFLEX MAGNESIUM: 3.1 MMOL/L (ref 3.5–5)
PROTEIN UA: NEGATIVE MG/DL
RBC # BLD: 3.84 M/UL (ref 4.2–5.4)
RBC UA: 1 /HPF (ref 0–4)
SODIUM BLD-SCNC: 144 MMOL/L (ref 136–145)
SPECIFIC GRAVITY UA: 1.02 (ref 1–1.03)
UROBILINOGEN, URINE: 0.2 E.U./DL
WBC # BLD: 6.4 K/UL (ref 4.8–10.8)
WBC UA: 7 /HPF (ref 0–5)

## 2019-09-19 PROCEDURE — 85025 COMPLETE CBC W/AUTO DIFF WBC: CPT

## 2019-09-19 PROCEDURE — 6360000002 HC RX W HCPCS: Performed by: HOSPITALIST

## 2019-09-19 PROCEDURE — 6370000000 HC RX 637 (ALT 250 FOR IP): Performed by: HOSPITALIST

## 2019-09-19 PROCEDURE — 2580000003 HC RX 258: Performed by: HOSPITALIST

## 2019-09-19 PROCEDURE — 80185 ASSAY OF PHENYTOIN TOTAL: CPT

## 2019-09-19 PROCEDURE — 81001 URINALYSIS AUTO W/SCOPE: CPT

## 2019-09-19 PROCEDURE — 80307 DRUG TEST PRSMV CHEM ANLYZR: CPT

## 2019-09-19 PROCEDURE — 1210000000 HC MED SURG R&B

## 2019-09-19 PROCEDURE — 6370000000 HC RX 637 (ALT 250 FOR IP): Performed by: PSYCHIATRY & NEUROLOGY

## 2019-09-19 PROCEDURE — 80048 BASIC METABOLIC PNL TOTAL CA: CPT

## 2019-09-19 PROCEDURE — 83735 ASSAY OF MAGNESIUM: CPT

## 2019-09-19 PROCEDURE — 99223 1ST HOSP IP/OBS HIGH 75: CPT | Performed by: PSYCHIATRY & NEUROLOGY

## 2019-09-19 PROCEDURE — 36415 COLL VENOUS BLD VENIPUNCTURE: CPT

## 2019-09-19 PROCEDURE — 99253 IP/OBS CNSLTJ NEW/EST LOW 45: CPT | Performed by: PSYCHIATRY & NEUROLOGY

## 2019-09-19 RX ORDER — LEVETIRACETAM 500 MG/1
2000 TABLET ORAL 2 TIMES DAILY
Status: DISCONTINUED | OUTPATIENT
Start: 2019-09-19 | End: 2019-09-20 | Stop reason: HOSPADM

## 2019-09-19 RX ORDER — BUPRENORPHINE HYDROCHLORIDE AND NALOXONE HYDROCHLORIDE DIHYDRATE 8; 2 MG/1; MG/1
2.5 TABLET SUBLINGUAL DAILY
Status: DISCONTINUED | OUTPATIENT
Start: 2019-09-19 | End: 2019-09-19 | Stop reason: SDUPTHER

## 2019-09-19 RX ORDER — PHENYTOIN SODIUM 100 MG/1
100 CAPSULE, EXTENDED RELEASE ORAL 3 TIMES DAILY
Status: ON HOLD | COMMUNITY
End: 2019-09-20 | Stop reason: HOSPADM

## 2019-09-19 RX ORDER — AMITRIPTYLINE HYDROCHLORIDE 150 MG/1
150 TABLET, FILM COATED ORAL NIGHTLY
COMMUNITY
Start: 2019-07-10

## 2019-09-19 RX ORDER — GABAPENTIN 600 MG/1
600 TABLET ORAL 4 TIMES DAILY
Status: DISCONTINUED | OUTPATIENT
Start: 2019-09-19 | End: 2019-09-20 | Stop reason: HOSPADM

## 2019-09-19 RX ORDER — HYDROXYZINE HYDROCHLORIDE 25 MG/1
25 TABLET, FILM COATED ORAL 3 TIMES DAILY PRN
Status: DISCONTINUED | OUTPATIENT
Start: 2019-09-19 | End: 2019-09-20 | Stop reason: HOSPADM

## 2019-09-19 RX ORDER — LORAZEPAM 2 MG/ML
2 INJECTION INTRAMUSCULAR EVERY 4 HOURS PRN
Status: DISCONTINUED | OUTPATIENT
Start: 2019-09-19 | End: 2019-09-20 | Stop reason: HOSPADM

## 2019-09-19 RX ORDER — NICOTINE 21 MG/24HR
1 PATCH, TRANSDERMAL 24 HOURS TRANSDERMAL DAILY
Status: DISCONTINUED | OUTPATIENT
Start: 2019-09-19 | End: 2019-09-20 | Stop reason: HOSPADM

## 2019-09-19 RX ORDER — BUPRENORPHINE HYDROCHLORIDE AND NALOXONE HYDROCHLORIDE DIHYDRATE 2; .5 MG/1; MG/1
2 TABLET SUBLINGUAL DAILY
Status: DISCONTINUED | OUTPATIENT
Start: 2019-09-19 | End: 2019-09-20 | Stop reason: HOSPADM

## 2019-09-19 RX ORDER — BUPRENORPHINE AND NALOXONE 8; 2 MG/1; MG/1
2 FILM, SOLUBLE BUCCAL; SUBLINGUAL DAILY
Status: DISCONTINUED | OUTPATIENT
Start: 2019-09-19 | End: 2019-09-20 | Stop reason: HOSPADM

## 2019-09-19 RX ORDER — PHENYTOIN SODIUM 100 MG/1
100 CAPSULE, EXTENDED RELEASE ORAL 3 TIMES DAILY
Status: DISCONTINUED | OUTPATIENT
Start: 2019-09-19 | End: 2019-09-20

## 2019-09-19 RX ADMIN — BUPRENORPHINE HYDROCHLORIDE AND NALOXONE HYDROCHLORIDE DIHYDRATE 2 TABLET: 2; .5 TABLET SUBLINGUAL at 12:56

## 2019-09-19 RX ADMIN — ACETAMINOPHEN 650 MG: 325 TABLET ORAL at 22:56

## 2019-09-19 RX ADMIN — ENOXAPARIN SODIUM 40 MG: 40 INJECTION SUBCUTANEOUS at 08:52

## 2019-09-19 RX ADMIN — GABAPENTIN 600 MG: 600 TABLET, FILM COATED ORAL at 17:18

## 2019-09-19 RX ADMIN — LEVETIRACETAM 2000 MG: 500 TABLET, FILM COATED ORAL at 08:52

## 2019-09-19 RX ADMIN — PHENYTOIN SODIUM 100 MG: 100 CAPSULE ORAL at 12:56

## 2019-09-19 RX ADMIN — LEVETIRACETAM 1500 MG: 500 TABLET ORAL at 01:36

## 2019-09-19 RX ADMIN — Medication 10 ML: at 10:31

## 2019-09-19 RX ADMIN — SODIUM CHLORIDE: 9 INJECTION, SOLUTION INTRAVENOUS at 01:34

## 2019-09-19 RX ADMIN — BUPRENORPHINE HYDROCHLORIDE, NALOXONE HYDROCHLORIDE 2 FILM: 8; 2 FILM, SOLUBLE BUCCAL; SUBLINGUAL at 09:37

## 2019-09-19 RX ADMIN — PHENYTOIN SODIUM 100 MG: 100 CAPSULE ORAL at 10:37

## 2019-09-19 RX ADMIN — Medication 10 ML: at 19:49

## 2019-09-19 RX ADMIN — LORAZEPAM 2 MG: 2 INJECTION INTRAMUSCULAR; INTRAVENOUS at 08:52

## 2019-09-19 RX ADMIN — GABAPENTIN 600 MG: 600 TABLET, FILM COATED ORAL at 19:49

## 2019-09-19 RX ADMIN — LEVETIRACETAM 2000 MG: 500 TABLET, FILM COATED ORAL at 19:47

## 2019-09-19 RX ADMIN — PHENYTOIN SODIUM 100 MG: 100 CAPSULE ORAL at 19:48

## 2019-09-19 ASSESSMENT — PAIN SCALES - GENERAL
PAINLEVEL_OUTOF10: 0
PAINLEVEL_OUTOF10: 9
PAINLEVEL_OUTOF10: 4
PAINLEVEL_OUTOF10: 8
PAINLEVEL_OUTOF10: 0

## 2019-09-19 ASSESSMENT — PAIN DESCRIPTION - PAIN TYPE: TYPE: CHRONIC PAIN

## 2019-09-19 NOTE — PROGRESS NOTES
Yobani Mata received from ICU to room # 306 . Mental Status: Patient is oriented, alert and coherent. Vitals:    09/19/19 1603   BP: 115/73   Pulse: 100   Resp: 18   Temp: 97.1 °F (36.2 °C)   SpO2: 98%     Placed on cardiac monitor: No.  Belongings: clothing, cell phone with patient at bedside . Family at bedside No.  Oriented Patient to room. Call light within reach. Yes. Transfer was: Well tolerated by patient. .    Electronically signed by Yasir Lucero RN on 9/19/2019 at 4:09 PM

## 2019-09-19 NOTE — CONSULTS
[facility] Neurology Consult        Patient:   Ewelina Colin  MR#:    [id number]  Account Number:                   [id number]      Room:    [address] [place] [address]   YOB: 1990  Date of Progress Note: 9/19/2019  Time of Note                           9:54 AM  Attending Physician:  Akhil Salmeron MD  Consulting Physician:   Edward Naqvi M.D.        [address]:  [facility]       HISTORY OF PRESENT ILLNESS:   This 34 y.o. 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 The patient has been followed by Dr Vy Hawkins and as per his note on 6/26/19       Fransico Hart is a 34y.o. year old female here for seizure disorder. Noting more breakthrough events recently. Seen at an outside ED recently for seizure, CT negative. Events started a few years ago, prior history of TBI, possible ICH noted, no surgery required. Seizures started a year or so after. Events described as MARAH, GTC activity noted, no bladder incontinence, no tongue biting. Still noting events, staring episodes. Noting more anxiety as well, some worsening there, no SI/HI. Increased stress noted. Prior Video EEG monitoring completed and was normal, no events captured however. MRI with nonspecific white matter abnormalities, no enhancement noted, one is pericallosal. Still on Keppra at 1500 mg bid and now off dilantin. No history of meningitis or encephalitis. Brother with epilepsy. \"    Her mother and patient are not the best historians and indicate that she may have 20 convulsive episodes a year. Typically she will get very angry and then start convulsions till ems arrives and gives her some medicine. Her extensive workup this far has been unremarkable. She is on Keppra, dilantin and clonazepam. Dr Vy Hawkins no longer wanted to fill her clonazepam and referred her to her PCP to have this filled. She was referred to psychiatry and apparently when she left the office she had another one of her convulsions.  On the day of admission she was taking to another Intravenous, 2 times per day, Osorio Toribio MD    sodium chloride flush 0.9 % injection 10 mL, 10 mL, Intravenous, PRN, Osorio Toribio MD    potassium chloride (KLOR-CON M) extended release tablet 40 mEq, 40 mEq, Oral, PRN **OR** potassium bicarb-citric acid (EFFER-K) effervescent tablet 40 mEq, 40 mEq, Oral, PRN **OR** potassium chloride 10 mEq/100 mL IVPB (Peripheral Line), 10 mEq, Intravenous, PRN, Osorio Toribio MD    magnesium sulfate 1 g in dextrose 5% 100 mL IVPB, 1 g, Intravenous, PRN, Osorio Toribio MD    magnesium hydroxide (MILK OF MAGNESIA) 400 MG/5ML suspension 30 mL, 30 mL, Oral, Daily PRN, Osorio Toribio MD    ondansetron (ZOFRAN) injection 4 mg, 4 mg, Intravenous, Q6H PRN, Osorio Toribio MD    enoxaparin (LOVENOX) injection 40 mg, 40 mg, Subcutaneous, Daily, Osorio Toribio MD, 40 mg at 09/19/19 0852    acetaminophen (TYLENOL) tablet 650 mg, 650 mg, Oral, Q4H PRN, Osorio Toribio MD    0.9 % sodium chloride infusion, , Intravenous, Continuous, Osorio Toribio MD, Last Rate: 125 mL/hr at 09/19/19 0134    LORazepam (ATIVAN) injection 1 mg, 1 mg, Intravenous, Q4H PRN, Osorio Toribio MD    Allergies:  Celexa [citalopram hydrobromide]    Social History:   TOBACCO:   reports that she has been smoking. She has been smoking about 0.50 packs per day. She has never used smokeless tobacco.  ETOH:   reports that she does not drink alcohol. Family History:       Problem Relation Age of Onset    High Blood Pressure Father            Physical Exam:    Vitals: /75   Pulse 86   Temp 98 °F (36.7 °C) (Temporal)   Resp 15   Ht 5' 7\" (1.702 m)   Wt 206 lb 9.6 oz (93.7 kg)   SpO2 97%   BMI 32.36 kg/m²     Constitutional - well developed, well nourished.     Eyes - conjunctiva normal.  Pupils react to light  Ear, nose, throat -hearing intact to finger rub No scars, masses, or lesions over external nose or ears, no atrophy of tongue  Neck-symmetric, no masses noted, no jugular vein

## 2019-09-19 NOTE — PROGRESS NOTES
4 Eyes Skin Assessment    Amadna Jean is being assessed upon: Transfer to New Unit    I agree that I, Kristie Deutsch, along with Tereso Mccray (either 2 RN's or 1 LPN and 1 RN) have performed a thorough Head to Toe Skin Assessment on the patient. ALL assessment sites listed below have been assessed. Areas assessed by both nurses:     [x]   Head, Face, and Ears   [x]   Shoulders, Back, and Chest  [x]   Arms, Elbows, and Hands   [x]   Coccyx, Sacrum, and Ischium  [x]   Legs, Feet, and Heels    Does the Patient have Skin Breakdown?  No    Vicente Prevention initiated: No  Wound Care Orders initiated: NA    [facility] nurse consulted for Pressure Injury (Stage 3,4, Unstageable, DTI, NWPT, and Complex wounds) and New or Established Ostomies: NA        Primary Nurse eSignature: Kristie Deutsch RN on 9/19/2019 at 4:07 PM      Co-Signer eSignature: Electronically signed by Violeta Dawson RN on 9/19/19 at 4:08 PM

## 2019-09-20 ENCOUNTER — TELEPHONE (OUTPATIENT)
Dept: NEUROSURGERY | Age: 29
End: 2019-09-20

## 2019-09-20 VITALS
OXYGEN SATURATION: 99 % | HEIGHT: 67 IN | RESPIRATION RATE: 18 BRPM | TEMPERATURE: 98.7 F | HEART RATE: 85 BPM | SYSTOLIC BLOOD PRESSURE: 121 MMHG | BODY MASS INDEX: 32.33 KG/M2 | WEIGHT: 206 LBS | DIASTOLIC BLOOD PRESSURE: 83 MMHG

## 2019-09-20 LAB
ANION GAP SERPL CALCULATED.3IONS-SCNC: 9 MMOL/L (ref 7–19)
BASOPHILS ABSOLUTE: 0 K/UL (ref 0–0.2)
BASOPHILS RELATIVE PERCENT: 0.5 % (ref 0–1)
BUN BLDV-MCNC: 4 MG/DL (ref 6–20)
CALCIUM SERPL-MCNC: 8.7 MG/DL (ref 8.6–10)
CHLORIDE BLD-SCNC: 108 MMOL/L (ref 98–111)
CO2: 28 MMOL/L (ref 22–29)
CREAT SERPL-MCNC: 0.5 MG/DL (ref 0.5–0.9)
EOSINOPHILS ABSOLUTE: 0.4 K/UL (ref 0–0.6)
EOSINOPHILS RELATIVE PERCENT: 6.3 % (ref 0–5)
GFR NON-AFRICAN AMERICAN: >60
GLUCOSE BLD-MCNC: 92 MG/DL (ref 74–109)
HCT VFR BLD CALC: 35.5 % (ref 37–47)
HEMOGLOBIN: 11.5 G/DL (ref 12–16)
IMMATURE GRANULOCYTES #: 0 K/UL
LYMPHOCYTES ABSOLUTE: 2.6 K/UL (ref 1.1–4.5)
LYMPHOCYTES RELATIVE PERCENT: 42.4 % (ref 20–40)
MCH RBC QN AUTO: 28.6 PG (ref 27–31)
MCHC RBC AUTO-ENTMCNC: 32.4 G/DL (ref 33–37)
MCV RBC AUTO: 88.3 FL (ref 81–99)
MONOCYTES ABSOLUTE: 0.4 K/UL (ref 0–0.9)
MONOCYTES RELATIVE PERCENT: 6.2 % (ref 0–10)
NEUTROPHILS ABSOLUTE: 2.7 K/UL (ref 1.5–7.5)
NEUTROPHILS RELATIVE PERCENT: 44.4 % (ref 50–65)
PDW BLD-RTO: 14.4 % (ref 11.5–14.5)
PLATELET # BLD: 154 K/UL (ref 130–400)
PMV BLD AUTO: 11.9 FL (ref 9.4–12.3)
POTASSIUM REFLEX MAGNESIUM: 4.1 MMOL/L (ref 3.5–5)
RBC # BLD: 4.02 M/UL (ref 4.2–5.4)
SODIUM BLD-SCNC: 145 MMOL/L (ref 136–145)
WBC # BLD: 6 K/UL (ref 4.8–10.8)

## 2019-09-20 PROCEDURE — 99233 SBSQ HOSP IP/OBS HIGH 50: CPT | Performed by: PSYCHIATRY & NEUROLOGY

## 2019-09-20 PROCEDURE — 6370000000 HC RX 637 (ALT 250 FOR IP): Performed by: HOSPITALIST

## 2019-09-20 PROCEDURE — 80048 BASIC METABOLIC PNL TOTAL CA: CPT

## 2019-09-20 PROCEDURE — 36415 COLL VENOUS BLD VENIPUNCTURE: CPT

## 2019-09-20 PROCEDURE — 6360000002 HC RX W HCPCS: Performed by: HOSPITALIST

## 2019-09-20 PROCEDURE — 2580000003 HC RX 258: Performed by: HOSPITALIST

## 2019-09-20 PROCEDURE — 85025 COMPLETE CBC W/AUTO DIFF WBC: CPT

## 2019-09-20 RX ORDER — HYDROXYZINE HYDROCHLORIDE 25 MG/1
25 TABLET, FILM COATED ORAL 3 TIMES DAILY PRN
Qty: 30 TABLET | Refills: 0 | Status: SHIPPED | OUTPATIENT
Start: 2019-09-20 | End: 2019-09-30

## 2019-09-20 RX ORDER — LEVETIRACETAM 1000 MG/1
2000 TABLET ORAL 2 TIMES DAILY
Qty: 60 TABLET | Refills: 3 | Status: SHIPPED | OUTPATIENT
Start: 2019-09-20

## 2019-09-20 RX ADMIN — BUPRENORPHINE HYDROCHLORIDE, NALOXONE HYDROCHLORIDE 2 FILM: 8; 2 FILM, SOLUBLE BUCCAL; SUBLINGUAL at 08:46

## 2019-09-20 RX ADMIN — Medication 10 ML: at 08:48

## 2019-09-20 RX ADMIN — GABAPENTIN 600 MG: 600 TABLET, FILM COATED ORAL at 08:48

## 2019-09-20 RX ADMIN — LEVETIRACETAM 2000 MG: 500 TABLET, FILM COATED ORAL at 08:45

## 2019-09-20 RX ADMIN — GABAPENTIN 600 MG: 600 TABLET, FILM COATED ORAL at 12:14

## 2019-09-20 RX ADMIN — BUPRENORPHINE HYDROCHLORIDE AND NALOXONE HYDROCHLORIDE DIHYDRATE 2 TABLET: 2; .5 TABLET SUBLINGUAL at 08:45

## 2019-09-20 RX ADMIN — ACETAMINOPHEN 650 MG: 325 TABLET ORAL at 12:14

## 2019-09-20 ASSESSMENT — PAIN SCALES - GENERAL
PAINLEVEL_OUTOF10: 4
PAINLEVEL_OUTOF10: 3
PAINLEVEL_OUTOF10: 0

## 2019-09-20 NOTE — DISCHARGE SUMMARY
presented to outside facility at [facility] apparently having seizures and then became combative between the seizures and was then intubated. Upon arrival here patient is completely calm, in no distress and wide awake and I decided to extubate and she tolerated it well. Patient is on keppra and takes suboxone as well. Patient is on psych meds as well. She states she feels fine other than dry throat  9/19/19  H/o seizure polysubstance abuse, presented to OSH for seizures became comative intubated then extubated here, UDS +ve merijuana opitates benzo, K 23.1 replaced, not sure if she had CT at OSH if not will order was cont home dose of keppra, IV ativan prn, seizure precautions, neuro consult , psych consult, said was taking dilantin 100 tid but dilantin level only 3, wants to go back on klonopin, noncompliant not motivated   9/20/19  Appreciate psych, agree no immediate threat for herself or others, per neurology non epileptic event/malingering by clinical description, increased keppra , did d/c dilantin  Physical Exam:    Vitals: /83   Pulse 85   Temp 98.7 °F (37.1 °C) (Oral)   Resp 18   Ht 5' 7\" (1.702 m)   Wt 206 lb (93.4 kg)   SpO2 99%   BMI 32.26 kg/m²    General appearance: alert and cooperative with exam  HEENT: atraumatic, eyes with clear conjunctiva and normal lids  Lungs: no increased work of breathing, diminished breath sounds bilaterally  Heart: S1, S2 normal  Abdomen: soft, non-tender; bowel sounds normal; no masses,  no organomegaly  Extremities:atraumatic, no cyanosis no edema no calf tenderness   Neurologic:non focal    Skin: no rashes, nodules. Discharge Medications:       Margaret Ill   Home Medication Instructions CRX:[id number]    Printed on:09/20/19 1051   Medication Information                      amitriptyline (ELAVIL) 150 MG tablet  Take 150 mg by mouth nightly             buprenorphine-naloxone (SUBOXONE) 8-2 MG SUBL SL tablet  2.5 tablets.  Clarified with [facility] in [place]             gabapentin (NEURONTIN) 600 MG tablet  600 mg 4 times daily . hydrOXYzine (ATARAX) 25 MG tablet  Take 1 tablet by mouth 3 times daily as needed for Anxiety             levETIRAcetam (KEPPRA) 1000 MG tablet  Take 2 tablets by mouth 2 times daily                 Discharge Instructions: Follow up with [facility]es in 7 days. Take medications as directed. Resume activity as tolerated. Diet: DIET GENERAL; Dental Soft     Disposition: Patient is medically stable and will be discharged home    Time spent on discharge 41 minutes. Signed:   Giovana Cano MD  Hospitalist service  9/20/2019  10:51 AM

## 2019-09-20 NOTE — PROGRESS NOTES
brain injury) ([facility] [address] 01/09/2018    Substance abuse ([facility] [address] 01/09/2018    Seizure ([facility] [address] 01/09/2018       Assessment/plan:   Principal Problem:    Seizure ([facility] [address]  Active Problems:    TBI (traumatic brain injury) ([facility] [address]    Substance abuse ([facility] [address]    Convulsions ([facility] [address]    Mood disorder ([facility] [address]  Resolved Problems:    * No resolved hospital problems.  *      Plan:       9/19/19  H/o seizure polysubstance abuse, presented to OSH for seizures became comative intubated then extubated here, UDS +ve merijuana opitates benzo, K 23.1 replaced, not sure if she had CT at OSH if not will order was cont home dose of keppra, IV ativan prn, seizure precautions, neuro consult , psych consult, said was taking dilantin 100 tid but dilantin level only 3, wants to go back on klonopin, noncompliant not motivated       Elsa Waldron MD  Hospitalist Service  9/20/2019  8:22 AM

## 2019-09-20 NOTE — PROGRESS NOTES
Patient:   Yasmine Pan  MR#:    [id number]   Room:    0306/306-01   YOB: 1990  Date of Progress Note: 9/20/2019  Time of Note                           7:27 AM  Consulting Physician:   Pryor Duane, M.D. Attending Physician:  Fritz Page MD     Chief complaint convulsion    S:This 34 y.o. female  here for seizure disorder. Noting more breakthrough events recently. Seen at an outside ED recently for seizure, CT negative. Events started a few years ago, prior history of TBI, possible ICH noted, no surgery required.  Seizures started a year or so after.  Events described as MARAH, GTC activity noted, no bladder incontinence, no tongue biting. Still noting events, staring episodes.  Noting more anxiety as well, some worsening there, no SI/HI.  Increased stress noted.  Prior Video EEG monitoring completed and was normal, no events captured however.  MRI with nonspecific white matter abnormalities, no enhancement noted, one is pericallosal. Still on Keppra at 1500 mg bid and now off dilantin.  No history of meningitis or encephalitis.  Brother with epilepsy.  \"     Her mother and patient are not the best historians and indicate that she may have 20 convulsive episodes a year. Typically she will get very angry and then start convulsions till ems arrives and gives her some medicine. Her extensive workup this far has been unremarkable. She is on Keppra, dilantin and clonazepam. Dr Monika De La Paz no longer wanted to fill her clonazepam and referred her to her PCP to have this filled. She was referred to psychiatry and apparently when she left the office she had another one of her convulsions. On the day of admission she was taking to another hospital for convulsion and she became very agitated and was intubated. She was transferred here and when she arrived she was apparently calm and cooperative. She was extubated and with no complaints. She is on Suboxone for a history of drug use.         OVERNIGHT AS PER NURSING NOTE      \"Patient calls the desk and says she is having a seizure. When this nurse comes to the room patient is sitting up in bed and she smiles at me. She states I just had a seizure. I asked her to describe the seizure. She said it is like a grand mal seizure and she usually gets valium when she has it. Patient is alert and oriented. Pupils equal and react to light. bp is 127/89 p 100 resp 16 even non-labored. Sat is 100 room air. Oral temp is 96.7. Skin is warm and dry. Will continue to monitor. \"        REVIEW OF SYSTEMS:  Constitutional: No fevers No chills  Neck:No stiffness  Respiratory: No shortness of breath  Cardiovascular: No chest pain No palpitations  Gastrointestinal: No abdominal pain    Genitourinary: No Dysuria  Neurological: No headache, no confusion    Past Medical History:      Diagnosis Date    Anxiety     Lumbar spine instability     Seizures (HCC)     Substance abuse ([facility] [address]     opioid addition     TBI (traumatic brain injury) ([facility] [address]        Past Surgical History:      Procedure Laterality Date    APPENDECTOMY      LEEP      MOUTH SURGERY      REMOVAL OF ALL TEETH BUT 3       Medications in Hospital:      Current Facility-Administered Medications:     LORazepam (ATIVAN) injection 2 mg, 2 mg, Intravenous, Q4H PRN, Izabela Morrison MD, 2 mg at 09/19/19 0852    levETIRAcetam (KEPPRA) tablet 2,000 mg, 2,000 mg, Oral, BID, Izabela Morrison MD, 2,000 mg at 09/19/19 1947    buprenorphine-naloxone (SUBOXONE) 8-2 MG SL film 2 Film, 2 Film, Sublingual, Daily, 2 Film at 09/19/19 0937 **AND** buprenorphine-naloxone (SUBOXONE) 2-0.5 MG SL tablet 2 tablet, 2 tablet, Sublingual, Daily, Izabela Morrison MD, 2 tablet at 09/19/19 1256    nicotine (NICODERM CQ) 21 MG/24HR 1 patch, 1 patch, Transdermal, Daily, Izabela Morrison MD, 1 patch at 09/19/19 1030    phenytoin (DILANTIN) ER capsule 100 mg, 100 mg, Oral, TID, Izabela Morrison MD, 100 mg at 09/19/19 1948    gabapentin (NEURONTIN) tablet 600 mg, 600 mg,

## 2019-11-16 ENCOUNTER — HOSPITAL ENCOUNTER (INPATIENT)
Age: 29
LOS: 1 days | Discharge: HOME | DRG: 101 | End: 2019-11-17
Attending: PSYCHIATRY & NEUROLOGY | Admitting: PSYCHIATRY & NEUROLOGY
Payer: COMMERCIAL

## 2019-11-16 PROBLEM — G40.901 STATUS EPILEPTICUS (HCC): Status: ACTIVE | Noted: 2019-11-16

## 2019-11-16 LAB — HCG(URINE) PREGNANCY TEST: NEGATIVE

## 2019-11-16 PROCEDURE — G0378 HOSPITAL OBSERVATION PER HR: HCPCS

## 2019-11-16 PROCEDURE — 6370000000 HC RX 637 (ALT 250 FOR IP): Performed by: PSYCHIATRY & NEUROLOGY

## 2019-11-16 PROCEDURE — 99223 1ST HOSP IP/OBS HIGH 75: CPT | Performed by: PSYCHIATRY & NEUROLOGY

## 2019-11-16 PROCEDURE — 6360000002 HC RX W HCPCS: Performed by: PSYCHIATRY & NEUROLOGY

## 2019-11-16 PROCEDURE — 96372 THER/PROPH/DIAG INJ SC/IM: CPT

## 2019-11-16 PROCEDURE — 2580000003 HC RX 258: Performed by: PSYCHIATRY & NEUROLOGY

## 2019-11-16 PROCEDURE — G0379 DIRECT REFER HOSPITAL OBSERV: HCPCS

## 2019-11-16 PROCEDURE — 84703 CHORIONIC GONADOTROPIN ASSAY: CPT

## 2019-11-16 RX ORDER — LORAZEPAM 2 MG/ML
1 INJECTION INTRAMUSCULAR EVERY 4 HOURS PRN
Status: DISCONTINUED | OUTPATIENT
Start: 2019-11-16 | End: 2019-11-17 | Stop reason: HOSPADM

## 2019-11-16 RX ORDER — NICOTINE 21 MG/24HR
1 PATCH, TRANSDERMAL 24 HOURS TRANSDERMAL DAILY
Status: DISCONTINUED | OUTPATIENT
Start: 2019-11-16 | End: 2019-11-17 | Stop reason: HOSPADM

## 2019-11-16 RX ORDER — ONDANSETRON 2 MG/ML
4 INJECTION INTRAMUSCULAR; INTRAVENOUS EVERY 6 HOURS PRN
Status: DISCONTINUED | OUTPATIENT
Start: 2019-11-16 | End: 2019-11-17 | Stop reason: HOSPADM

## 2019-11-16 RX ORDER — GABAPENTIN 600 MG/1
600 TABLET ORAL 4 TIMES DAILY
Status: DISCONTINUED | OUTPATIENT
Start: 2019-11-16 | End: 2019-11-17 | Stop reason: HOSPADM

## 2019-11-16 RX ORDER — LEVETIRACETAM 500 MG/1
2000 TABLET ORAL 2 TIMES DAILY
Status: DISCONTINUED | OUTPATIENT
Start: 2019-11-16 | End: 2019-11-17 | Stop reason: HOSPADM

## 2019-11-16 RX ORDER — BUPRENORPHINE AND NALOXONE 8; 2 MG/1; MG/1
2.5 FILM, SOLUBLE BUCCAL; SUBLINGUAL DAILY
Status: DISCONTINUED | OUTPATIENT
Start: 2019-11-16 | End: 2019-11-17

## 2019-11-16 RX ORDER — LAMOTRIGINE 25 MG/1
25 TABLET ORAL 2 TIMES DAILY
Status: DISCONTINUED | OUTPATIENT
Start: 2019-11-16 | End: 2019-11-17 | Stop reason: HOSPADM

## 2019-11-16 RX ORDER — SODIUM CHLORIDE 0.9 % (FLUSH) 0.9 %
10 SYRINGE (ML) INJECTION EVERY 12 HOURS SCHEDULED
Status: DISCONTINUED | OUTPATIENT
Start: 2019-11-16 | End: 2019-11-17 | Stop reason: HOSPADM

## 2019-11-16 RX ORDER — ACETAMINOPHEN 325 MG/1
650 TABLET ORAL EVERY 4 HOURS PRN
Status: DISCONTINUED | OUTPATIENT
Start: 2019-11-16 | End: 2019-11-17 | Stop reason: HOSPADM

## 2019-11-16 RX ORDER — AMITRIPTYLINE HYDROCHLORIDE 75 MG/1
150 TABLET, FILM COATED ORAL NIGHTLY
Status: DISCONTINUED | OUTPATIENT
Start: 2019-11-16 | End: 2019-11-17 | Stop reason: HOSPADM

## 2019-11-16 RX ORDER — SODIUM CHLORIDE 0.9 % (FLUSH) 0.9 %
10 SYRINGE (ML) INJECTION PRN
Status: DISCONTINUED | OUTPATIENT
Start: 2019-11-16 | End: 2019-11-17 | Stop reason: HOSPADM

## 2019-11-16 RX ORDER — BUPRENORPHINE HYDROCHLORIDE AND NALOXONE HYDROCHLORIDE DIHYDRATE 8; 2 MG/1; MG/1
1 TABLET SUBLINGUAL 2 TIMES DAILY
Status: DISCONTINUED | OUTPATIENT
Start: 2019-11-16 | End: 2019-11-16 | Stop reason: SDUPTHER

## 2019-11-16 RX ADMIN — ENOXAPARIN SODIUM 40 MG: 40 INJECTION SUBCUTANEOUS at 21:46

## 2019-11-16 RX ADMIN — ACETAMINOPHEN 650 MG: 325 TABLET ORAL at 21:59

## 2019-11-16 RX ADMIN — LEVETIRACETAM 2000 MG: 500 TABLET ORAL at 21:45

## 2019-11-16 RX ADMIN — Medication 10 ML: at 21:46

## 2019-11-16 RX ADMIN — GABAPENTIN 600 MG: 600 TABLET, FILM COATED ORAL at 21:46

## 2019-11-16 RX ADMIN — AMITRIPTYLINE HYDROCHLORIDE 150 MG: 75 TABLET, FILM COATED ORAL at 21:46

## 2019-11-16 RX ADMIN — BUPRENORPHINE HYDROCHLORIDE, NALOXONE HYDROCHLORIDE 2.5 FILM: 8; 2 FILM, SOLUBLE BUCCAL; SUBLINGUAL at 23:13

## 2019-11-16 RX ADMIN — LAMOTRIGINE 25 MG: 25 TABLET ORAL at 21:46

## 2019-11-16 SDOH — HEALTH STABILITY: MENTAL HEALTH: HOW OFTEN DO YOU HAVE A DRINK CONTAINING ALCOHOL?: MONTHLY OR LESS

## 2019-11-16 SDOH — HEALTH STABILITY: MENTAL HEALTH: HOW MANY STANDARD DRINKS CONTAINING ALCOHOL DO YOU HAVE ON A TYPICAL DAY?: 1 OR 2

## 2019-11-16 ASSESSMENT — PAIN - FUNCTIONAL ASSESSMENT: PAIN_FUNCTIONAL_ASSESSMENT: ACTIVITIES ARE NOT PREVENTED

## 2019-11-16 ASSESSMENT — PAIN DESCRIPTION - PAIN TYPE: TYPE: ACUTE PAIN

## 2019-11-16 ASSESSMENT — PAIN DESCRIPTION - DESCRIPTORS: DESCRIPTORS: HEADACHE

## 2019-11-16 ASSESSMENT — PAIN DESCRIPTION - ORIENTATION: ORIENTATION: ANTERIOR

## 2019-11-16 ASSESSMENT — PAIN SCALES - GENERAL
PAINLEVEL_OUTOF10: 5
PAINLEVEL_OUTOF10: 8

## 2019-11-16 ASSESSMENT — PAIN DESCRIPTION - LOCATION: LOCATION: HEAD

## 2019-11-16 ASSESSMENT — PAIN DESCRIPTION - PROGRESSION: CLINICAL_PROGRESSION: NOT CHANGED

## 2019-11-16 ASSESSMENT — PAIN DESCRIPTION - ONSET: ONSET: ON-GOING

## 2019-11-16 ASSESSMENT — PAIN DESCRIPTION - FREQUENCY: FREQUENCY: INTERMITTENT

## 2019-11-17 ENCOUNTER — APPOINTMENT (OUTPATIENT)
Dept: CT IMAGING | Age: 29
DRG: 101 | End: 2019-11-17
Attending: PSYCHIATRY & NEUROLOGY
Payer: COMMERCIAL

## 2019-11-17 VITALS
HEART RATE: 111 BPM | DIASTOLIC BLOOD PRESSURE: 79 MMHG | OXYGEN SATURATION: 98 % | HEIGHT: 67 IN | SYSTOLIC BLOOD PRESSURE: 116 MMHG | BODY MASS INDEX: 31.39 KG/M2 | RESPIRATION RATE: 20 BRPM | TEMPERATURE: 98.1 F | WEIGHT: 200 LBS

## 2019-11-17 PROBLEM — R56.9 SEIZURE (HCC): Status: RESOLVED | Noted: 2018-01-09 | Resolved: 2019-11-17

## 2019-11-17 PROBLEM — G40.109 PARTIAL SYMPTOMATIC EPILEPSY (HCC): Chronic | Status: ACTIVE | Noted: 2019-11-17

## 2019-11-17 PROBLEM — G40.901 STATUS EPILEPTICUS (HCC): Status: RESOLVED | Noted: 2019-11-16 | Resolved: 2019-11-17

## 2019-11-17 LAB
ALBUMIN SERPL-MCNC: 4.3 G/DL (ref 3.5–5.2)
ALP BLD-CCNC: 119 U/L (ref 35–104)
ALT SERPL-CCNC: 8 U/L (ref 5–33)
ANION GAP SERPL CALCULATED.3IONS-SCNC: 14 MMOL/L (ref 7–19)
AST SERPL-CCNC: 13 U/L (ref 5–32)
BASOPHILS ABSOLUTE: 0 K/UL (ref 0–0.2)
BASOPHILS RELATIVE PERCENT: 0.4 % (ref 0–1)
BILIRUB SERPL-MCNC: <0.2 MG/DL (ref 0.2–1.2)
BUN BLDV-MCNC: 9 MG/DL (ref 6–20)
CALCIUM SERPL-MCNC: 9.5 MG/DL (ref 8.6–10)
CHLORIDE BLD-SCNC: 102 MMOL/L (ref 98–111)
CO2: 28 MMOL/L (ref 22–29)
CREAT SERPL-MCNC: 0.7 MG/DL (ref 0.5–0.9)
EOSINOPHILS ABSOLUTE: 0.1 K/UL (ref 0–0.6)
EOSINOPHILS RELATIVE PERCENT: 1.5 % (ref 0–5)
GFR NON-AFRICAN AMERICAN: >60
GLUCOSE BLD-MCNC: 104 MG/DL (ref 74–109)
HCT VFR BLD CALC: 42.3 % (ref 37–47)
HEMOGLOBIN: 13.1 G/DL (ref 12–16)
IMMATURE GRANULOCYTES #: 0 K/UL
LYMPHOCYTES ABSOLUTE: 3.2 K/UL (ref 1.1–4.5)
LYMPHOCYTES RELATIVE PERCENT: 43.7 % (ref 20–40)
MCH RBC QN AUTO: 28.5 PG (ref 27–31)
MCHC RBC AUTO-ENTMCNC: 31 G/DL (ref 33–37)
MCV RBC AUTO: 92.2 FL (ref 81–99)
MONOCYTES ABSOLUTE: 0.4 K/UL (ref 0–0.9)
MONOCYTES RELATIVE PERCENT: 5 % (ref 0–10)
NEUTROPHILS ABSOLUTE: 3.6 K/UL (ref 1.5–7.5)
NEUTROPHILS RELATIVE PERCENT: 49.1 % (ref 50–65)
PDW BLD-RTO: 12.9 % (ref 11.5–14.5)
PLATELET # BLD: 228 K/UL (ref 130–400)
PMV BLD AUTO: 11.5 FL (ref 9.4–12.3)
POTASSIUM REFLEX MAGNESIUM: 4.7 MMOL/L (ref 3.5–5)
RBC # BLD: 4.59 M/UL (ref 4.2–5.4)
SODIUM BLD-SCNC: 144 MMOL/L (ref 136–145)
TOTAL CK: 56 U/L (ref 26–192)
TOTAL PROTEIN: 6.8 G/DL (ref 6.6–8.7)
WBC # BLD: 7.3 K/UL (ref 4.8–10.8)

## 2019-11-17 PROCEDURE — 82550 ASSAY OF CK (CPK): CPT

## 2019-11-17 PROCEDURE — 1210000000 HC MED SURG R&B

## 2019-11-17 PROCEDURE — 6360000002 HC RX W HCPCS: Performed by: PSYCHIATRY & NEUROLOGY

## 2019-11-17 PROCEDURE — 6370000000 HC RX 637 (ALT 250 FOR IP): Performed by: PSYCHIATRY & NEUROLOGY

## 2019-11-17 PROCEDURE — 70450 CT HEAD/BRAIN W/O DYE: CPT

## 2019-11-17 PROCEDURE — 80053 COMPREHEN METABOLIC PANEL: CPT

## 2019-11-17 PROCEDURE — 99238 HOSP IP/OBS DSCHRG MGMT 30/<: CPT | Performed by: PSYCHIATRY & NEUROLOGY

## 2019-11-17 PROCEDURE — 2580000003 HC RX 258: Performed by: PSYCHIATRY & NEUROLOGY

## 2019-11-17 PROCEDURE — 36415 COLL VENOUS BLD VENIPUNCTURE: CPT

## 2019-11-17 PROCEDURE — 85025 COMPLETE CBC W/AUTO DIFF WBC: CPT

## 2019-11-17 PROCEDURE — 84146 ASSAY OF PROLACTIN: CPT

## 2019-11-17 PROCEDURE — 80177 DRUG SCRN QUAN LEVETIRACETAM: CPT

## 2019-11-17 RX ORDER — BUPRENORPHINE AND NALOXONE 8; 2 MG/1; MG/1
2.5 FILM, SOLUBLE BUCCAL; SUBLINGUAL DAILY
Status: DISCONTINUED | OUTPATIENT
Start: 2019-11-17 | End: 2019-11-17 | Stop reason: HOSPADM

## 2019-11-17 RX ORDER — BUPRENORPHINE AND NALOXONE 8; 2 MG/1; MG/1
2.5 FILM, SOLUBLE BUCCAL; SUBLINGUAL 2 TIMES DAILY
Status: DISCONTINUED | OUTPATIENT
Start: 2019-11-17 | End: 2019-11-17

## 2019-11-17 RX ORDER — LAMOTRIGINE 25 MG/1
50 TABLET ORAL 2 TIMES DAILY
Qty: 120 TABLET | Refills: 2 | Status: SHIPPED | OUTPATIENT
Start: 2019-11-17

## 2019-11-17 RX ORDER — BUTALBITAL, ACETAMINOPHEN AND CAFFEINE 50; 325; 40 MG/1; MG/1; MG/1
1 TABLET ORAL EVERY 6 HOURS PRN
Status: DISCONTINUED | OUTPATIENT
Start: 2019-11-17 | End: 2019-11-17 | Stop reason: HOSPADM

## 2019-11-17 RX ADMIN — LEVETIRACETAM 2000 MG: 500 TABLET ORAL at 08:41

## 2019-11-17 RX ADMIN — GABAPENTIN 600 MG: 600 TABLET, FILM COATED ORAL at 13:02

## 2019-11-17 RX ADMIN — ACETAMINOPHEN 650 MG: 325 TABLET ORAL at 08:41

## 2019-11-17 RX ADMIN — Medication 10 ML: at 08:44

## 2019-11-17 RX ADMIN — GABAPENTIN 600 MG: 600 TABLET, FILM COATED ORAL at 08:41

## 2019-11-17 RX ADMIN — LAMOTRIGINE 25 MG: 25 TABLET ORAL at 08:41

## 2019-11-17 RX ADMIN — ACETAMINOPHEN 650 MG: 325 TABLET ORAL at 13:02

## 2019-11-17 RX ADMIN — BUPRENORPHINE HYDROCHLORIDE, NALOXONE HYDROCHLORIDE 2.5 FILM: 8; 2 FILM, SOLUBLE BUCCAL; SUBLINGUAL at 13:51

## 2019-11-17 ASSESSMENT — ENCOUNTER SYMPTOMS
COUGH: 0
BACK PAIN: 1
PHOTOPHOBIA: 0
NAUSEA: 0
VOMITING: 0
SHORTNESS OF BREATH: 0

## 2019-11-17 ASSESSMENT — PAIN SCALES - GENERAL
PAINLEVEL_OUTOF10: 7
PAINLEVEL_OUTOF10: 7

## 2019-11-18 LAB
KEPPRA: 53 UG/ML (ref 12–46)
PROLACTIN: 43.4 NG/ML (ref 2.8–26)

## 2019-12-30 ENCOUNTER — OFFICE VISIT (OUTPATIENT)
Dept: NEUROSURGERY | Age: 29
End: 2019-12-30

## 2019-12-30 VITALS
OXYGEN SATURATION: 98 % | BODY MASS INDEX: 32.11 KG/M2 | SYSTOLIC BLOOD PRESSURE: 102 MMHG | HEART RATE: 102 BPM | WEIGHT: 204.6 LBS | HEIGHT: 67 IN | DIASTOLIC BLOOD PRESSURE: 68 MMHG

## 2019-12-30 DIAGNOSIS — F41.9 ANXIETY: ICD-10-CM

## 2019-12-30 DIAGNOSIS — F19.10 SUBSTANCE ABUSE (HCC): ICD-10-CM

## 2019-12-30 DIAGNOSIS — R56.9 CONVULSIONS, UNSPECIFIED CONVULSION TYPE (HCC): ICD-10-CM

## 2019-12-30 DIAGNOSIS — R93.0 ABNORMAL MRI OF HEAD: Primary | ICD-10-CM

## 2019-12-30 PROCEDURE — 99214 OFFICE O/P EST MOD 30 MIN: CPT | Performed by: PSYCHIATRY & NEUROLOGY

## 2019-12-30 RX ORDER — LAMOTRIGINE 25 MG/1
100 TABLET ORAL 2 TIMES DAILY
Qty: 240 TABLET | Refills: 3 | Status: SHIPPED | OUTPATIENT
Start: 2019-12-30

## 2020-03-30 ENCOUNTER — TELEPHONE (OUTPATIENT)
Dept: NEUROSURGERY | Age: 30
End: 2020-03-30